# Patient Record
Sex: FEMALE | Race: WHITE
[De-identification: names, ages, dates, MRNs, and addresses within clinical notes are randomized per-mention and may not be internally consistent; named-entity substitution may affect disease eponyms.]

---

## 2010-06-09 PROCEDURE — 5A1935Z RESPIRATORY VENTILATION, LESS THAN 24 CONSECUTIVE HOURS: ICD-10-PCS | Performed by: SURGERY

## 2017-02-10 ENCOUNTER — HOSPITAL ENCOUNTER (INPATIENT)
Dept: HOSPITAL 80 - FED | Age: 47
LOS: 3 days | Discharge: HOME | DRG: 193 | End: 2017-02-13
Attending: INTERNAL MEDICINE | Admitting: INTERNAL MEDICINE
Payer: MEDICAID

## 2017-02-10 DIAGNOSIS — Z59.0: ICD-10-CM

## 2017-02-10 DIAGNOSIS — J44.1: ICD-10-CM

## 2017-02-10 DIAGNOSIS — R29.818: ICD-10-CM

## 2017-02-10 DIAGNOSIS — F17.200: ICD-10-CM

## 2017-02-10 DIAGNOSIS — J96.01: ICD-10-CM

## 2017-02-10 DIAGNOSIS — K21.9: ICD-10-CM

## 2017-02-10 DIAGNOSIS — E87.6: ICD-10-CM

## 2017-02-10 DIAGNOSIS — J18.8: Primary | ICD-10-CM

## 2017-02-10 LAB
% IMMATURE GRANULYOCYTES: 0.2 % (ref 0–1.1)
ABSOLUTE IMMATURE GRANULOCYTES: 0.03 10^3/UL (ref 0–0.1)
ABSOLUTE NRBC COUNT: 0 10^3/UL (ref 0–0.01)
ADD DIFF?: NO
ADD MORPH?: NO
ADD SCAN?: NO
ANION GAP SERPL CALC-SCNC: 14 MEQ/L (ref 8–16)
ATYPICAL LYMPHOCYTE FLAG: 10 (ref 0–99)
BILIRUB SERPL-MCNC: 0.4 MG/DL (ref 0.1–1.4)
CALCIUM SERPL-MCNC: 9.2 MG/DL (ref 8.5–10.4)
CHLORIDE SERPL-SCNC: 105 MEQ/L (ref 97–110)
CO2 SERPL-SCNC: 21 MEQ/L (ref 22–31)
CREAT SERPL-MCNC: 0.5 MG/DL (ref 0.6–1)
ERYTHROCYTE [DISTWIDTH] IN BLOOD BY AUTOMATED COUNT: 13.7 % (ref 11.5–15.2)
FRAGMENT RBC FLAG: 0 (ref 0–99)
GFR SERPL CREATININE-BSD FRML MDRD: > 60 ML/MIN/{1.73_M2}
GLUCOSE SERPL-MCNC: 92 MG/DL (ref 70–100)
HCT VFR BLD CALC: 36.6 % (ref 38–47)
HGB BLD-MCNC: 12.2 G/DL (ref 12.6–16.3)
LEFT SHIFT FLG: 0 (ref 0–99)
LIPEMIA HEMOLYSIS FLAG: 80 (ref 0–99)
MCH RBC BLDCO QN: 30.4 PG (ref 27.9–34.1)
MCHC RBC AUTO-ENTMCNC: 33.3 G/DL (ref 32.4–36.7)
MCV RBC AUTO: 91.3 FL (ref 81.5–99.8)
NRBC-AUTO%: 0 % (ref 0–0.2)
PLATELET # BLD: 307 10^3/UL (ref 150–400)
PLATELET CLUMPS FLAG: 0 (ref 0–99)
PMV BLD AUTO: 11.1 FL (ref 8.7–11.7)
POTASSIUM SERPL-SCNC: 3.4 MEQ/L (ref 3.5–5.2)
RBC # BLD AUTO: 4.01 10^6/UL (ref 4.18–5.33)
SODIUM SERPL-SCNC: 140 MEQ/L (ref 134–144)

## 2017-02-10 RX ADMIN — IPRATROPIUM BROMIDE AND ALBUTEROL SULFATE SCH ML: .5; 3 SOLUTION RESPIRATORY (INHALATION) at 22:25

## 2017-02-10 RX ADMIN — PROMETHAZINE HYDROCHLORIDE PRN MG: 25 INJECTION INTRAMUSCULAR; INTRAVENOUS at 22:21

## 2017-02-10 RX ADMIN — DEXTROSE MONOHYDRATE, SODIUM CHLORIDE, AND POTASSIUM CHLORIDE SCH MLS: 50; 4.5; 1.49 INJECTION, SOLUTION INTRAVENOUS at 21:20

## 2017-02-10 SDOH — ECONOMIC STABILITY - HOUSING INSECURITY: HOMELESSNESS: Z59.0

## 2017-02-10 NOTE — DX
Portable AP Upright Chest, at 6:24 PM



Clinical History: 46-year-old female in the ED with dyspnea and chest pain.



Comparison Study: None.



Findings: There are postcholecystectomy clips in the right upper quadrant of the abdomen. There are m
ild hypoventilatory features. There are bilateral perihilar interstitial/alveolar infiltrates, left g
reater than right. The cardiac silhouette is borderline-enlarged (although its size may be amplified 
by the poor inspiratory effort and the portable AP technique). There is no pleural effusion or pneumo
thorax. The trachea is midline. The osseous structures are age-appropriate.



Impression:



1. Perihilar interstitial/alveolar infiltrates, left greater than right.

2. Mild hypoventilatory changes.

3. Query borderline-cardiomegaly.



When clinically feasible, PA and lateral radiographs in the department would be helpful.
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
## 2017-02-11 LAB
% IMMATURE GRANULYOCYTES: 0.6 % (ref 0–1.1)
ABSOLUTE IMMATURE GRANULOCYTES: 0.05 10^3/UL (ref 0–0.1)
ABSOLUTE NRBC COUNT: 0 10^3/UL (ref 0–0.01)
ADD DIFF?: NO
ADD MORPH?: NO
ADD SCAN?: NO
ANION GAP SERPL CALC-SCNC: 10 MEQ/L (ref 8–16)
ATYPICAL LYMPHOCYTE FLAG: 0 (ref 0–99)
CALCIUM SERPL-MCNC: 9 MG/DL (ref 8.5–10.4)
CHLORIDE SERPL-SCNC: 110 MEQ/L (ref 97–110)
CO2 SERPL-SCNC: 22 MEQ/L (ref 22–31)
CREAT SERPL-MCNC: 0.4 MG/DL (ref 0.6–1)
ERYTHROCYTE [DISTWIDTH] IN BLOOD BY AUTOMATED COUNT: 13.7 % (ref 11.5–15.2)
FRAGMENT RBC FLAG: 0 (ref 0–99)
GFR SERPL CREATININE-BSD FRML MDRD: > 60 ML/MIN/{1.73_M2}
GLUCOSE SERPL-MCNC: 181 MG/DL (ref 70–100)
HCT VFR BLD CALC: 34.5 % (ref 38–47)
HGB BLD-MCNC: 11.5 G/DL (ref 12.6–16.3)
LEFT SHIFT FLG: 10 (ref 0–99)
LIPEMIA HEMOLYSIS FLAG: 80 (ref 0–99)
MCH RBC BLDCO QN: 30.3 PG (ref 27.9–34.1)
MCHC RBC AUTO-ENTMCNC: 33.3 G/DL (ref 32.4–36.7)
MCV RBC AUTO: 90.8 FL (ref 81.5–99.8)
NRBC-AUTO%: 0 % (ref 0–0.2)
PLATELET # BLD: 276 10^3/UL (ref 150–400)
PLATELET CLUMPS FLAG: 0 (ref 0–99)
PMV BLD AUTO: 10.4 FL (ref 8.7–11.7)
POTASSIUM SERPL-SCNC: 3.8 MEQ/L (ref 3.5–5.2)
RBC # BLD AUTO: 3.8 10^6/UL (ref 4.18–5.33)
SODIUM SERPL-SCNC: 142 MEQ/L (ref 134–144)

## 2017-02-11 RX ADMIN — ACETAMINOPHEN PRN MG: 325 TABLET ORAL at 06:26

## 2017-02-11 RX ADMIN — IPRATROPIUM BROMIDE AND ALBUTEROL SULFATE SCH ML: .5; 3 SOLUTION RESPIRATORY (INHALATION) at 17:02

## 2017-02-11 RX ADMIN — PROMETHAZINE HYDROCHLORIDE PRN MG: 25 INJECTION INTRAMUSCULAR; INTRAVENOUS at 08:15

## 2017-02-11 RX ADMIN — IBUPROFEN PRN MG: 200 TABLET, FILM COATED ORAL at 18:40

## 2017-02-11 RX ADMIN — IPRATROPIUM BROMIDE AND ALBUTEROL SULFATE SCH ML: .5; 3 SOLUTION RESPIRATORY (INHALATION) at 11:52

## 2017-02-11 RX ADMIN — IBUPROFEN PRN MG: 200 TABLET, FILM COATED ORAL at 12:38

## 2017-02-11 RX ADMIN — DEXTROSE MONOHYDRATE, SODIUM CHLORIDE, AND POTASSIUM CHLORIDE SCH MLS: 50; 4.5; 1.49 INJECTION, SOLUTION INTRAVENOUS at 08:18

## 2017-02-11 RX ADMIN — METHOCARBAMOL PRN MG: 750 TABLET ORAL at 16:58

## 2017-02-11 RX ADMIN — PROMETHAZINE HYDROCHLORIDE PRN MG: 25 INJECTION INTRAMUSCULAR; INTRAVENOUS at 19:49

## 2017-02-11 RX ADMIN — IPRATROPIUM BROMIDE AND ALBUTEROL SULFATE SCH ML: .5; 3 SOLUTION RESPIRATORY (INHALATION) at 21:35

## 2017-02-11 RX ADMIN — IPRATROPIUM BROMIDE AND ALBUTEROL SULFATE SCH ML: .5; 3 SOLUTION RESPIRATORY (INHALATION) at 05:10

## 2017-02-11 RX ADMIN — ACETAMINOPHEN PRN MG: 325 TABLET ORAL at 21:38
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
## 2017-02-12 RX ADMIN — IBUPROFEN PRN MG: 200 TABLET, FILM COATED ORAL at 11:05

## 2017-02-12 RX ADMIN — IPRATROPIUM BROMIDE AND ALBUTEROL SULFATE SCH ML: .5; 3 SOLUTION RESPIRATORY (INHALATION) at 17:46

## 2017-02-12 RX ADMIN — IBUPROFEN PRN MG: 200 TABLET, FILM COATED ORAL at 22:07

## 2017-02-12 RX ADMIN — ACETAMINOPHEN PRN MG: 325 TABLET ORAL at 13:37

## 2017-02-12 RX ADMIN — IPRATROPIUM BROMIDE AND ALBUTEROL SULFATE SCH ML: .5; 3 SOLUTION RESPIRATORY (INHALATION) at 21:52

## 2017-02-12 RX ADMIN — IPRATROPIUM BROMIDE AND ALBUTEROL SULFATE SCH ML: .5; 3 SOLUTION RESPIRATORY (INHALATION) at 05:37

## 2017-02-12 RX ADMIN — METHOCARBAMOL PRN MG: 750 TABLET ORAL at 13:38

## 2017-02-12 RX ADMIN — IPRATROPIUM BROMIDE AND ALBUTEROL SULFATE SCH ML: .5; 3 SOLUTION RESPIRATORY (INHALATION) at 11:27

## 2017-02-12 RX ADMIN — METHOCARBAMOL PRN MG: 750 TABLET ORAL at 22:07

## 2017-02-12 RX ADMIN — PANTOPRAZOLE SODIUM SCH MG: 40 TABLET, DELAYED RELEASE ORAL at 11:12

## 2017-02-12 RX ADMIN — METHOCARBAMOL PRN MG: 750 TABLET ORAL at 08:50

## 2017-02-12 RX ADMIN — METHOCARBAMOL PRN MG: 750 TABLET ORAL at 01:28
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
## 2017-02-13 VITALS — RESPIRATION RATE: 18 BRPM

## 2017-02-13 VITALS — DIASTOLIC BLOOD PRESSURE: 61 MMHG | SYSTOLIC BLOOD PRESSURE: 107 MMHG | HEART RATE: 84 BPM | TEMPERATURE: 98.4 F

## 2017-02-13 VITALS — OXYGEN SATURATION: 92 %

## 2017-02-13 RX ADMIN — PANTOPRAZOLE SODIUM SCH MG: 40 TABLET, DELAYED RELEASE ORAL at 08:29

## 2017-02-13 RX ADMIN — IBUPROFEN PRN MG: 200 TABLET, FILM COATED ORAL at 13:11

## 2017-02-13 RX ADMIN — ACETAMINOPHEN PRN MG: 325 TABLET ORAL at 08:37

## 2017-02-13 RX ADMIN — IPRATROPIUM BROMIDE AND ALBUTEROL SULFATE SCH ML: .5; 3 SOLUTION RESPIRATORY (INHALATION) at 11:05

## 2017-02-13 RX ADMIN — IPRATROPIUM BROMIDE AND ALBUTEROL SULFATE SCH ML: .5; 3 SOLUTION RESPIRATORY (INHALATION) at 05:26

## 2017-02-13 RX ADMIN — METHOCARBAMOL PRN MG: 750 TABLET ORAL at 13:11

## 2017-02-13 RX ADMIN — METHOCARBAMOL PRN MG: 750 TABLET ORAL at 08:30
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
## 2017-02-15 ENCOUNTER — HOSPITAL ENCOUNTER (EMERGENCY)
Dept: HOSPITAL 80 - FED | Age: 47
Discharge: HOME | End: 2017-02-15
Payer: MEDICAID

## 2017-02-15 VITALS
TEMPERATURE: 98.4 F | DIASTOLIC BLOOD PRESSURE: 84 MMHG | HEART RATE: 82 BPM | RESPIRATION RATE: 18 BRPM | OXYGEN SATURATION: 94 % | SYSTOLIC BLOOD PRESSURE: 160 MMHG

## 2017-02-15 DIAGNOSIS — Z87.891: ICD-10-CM

## 2017-02-15 DIAGNOSIS — Z79.82: ICD-10-CM

## 2017-02-15 DIAGNOSIS — J20.9: Primary | ICD-10-CM

## 2017-02-15 DIAGNOSIS — J18.9: ICD-10-CM
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
## 2017-02-16 ENCOUNTER — HOSPITAL ENCOUNTER (OUTPATIENT)
Dept: HOSPITAL 80 - FED | Age: 47
Setting detail: OBSERVATION
LOS: 1 days | Discharge: HOME | End: 2017-02-17
Attending: INTERNAL MEDICINE | Admitting: INTERNAL MEDICINE
Payer: MEDICAID

## 2017-02-16 DIAGNOSIS — J44.9: ICD-10-CM

## 2017-02-16 DIAGNOSIS — Z87.891: ICD-10-CM

## 2017-02-16 DIAGNOSIS — J96.01: ICD-10-CM

## 2017-02-16 DIAGNOSIS — R07.89: ICD-10-CM

## 2017-02-16 DIAGNOSIS — G62.9: ICD-10-CM

## 2017-02-16 DIAGNOSIS — K21.9: ICD-10-CM

## 2017-02-16 DIAGNOSIS — D72.829: ICD-10-CM

## 2017-02-16 DIAGNOSIS — J18.9: Primary | ICD-10-CM

## 2017-02-16 DIAGNOSIS — F41.9: ICD-10-CM

## 2017-02-16 DIAGNOSIS — Z59.0: ICD-10-CM

## 2017-02-16 DIAGNOSIS — Z87.820: ICD-10-CM

## 2017-02-16 LAB
% IMMATURE GRANULYOCYTES: 0.6 % (ref 0–1.1)
ABSOLUTE IMMATURE GRANULOCYTES: 0.09 10^3/UL (ref 0–0.1)
ABSOLUTE NRBC COUNT: 0 10^3/UL (ref 0–0.01)
ADD DIFF?: NO
ADD MORPH?: NO
ADD SCAN?: NO
ANION GAP SERPL CALC-SCNC: 14 MEQ/L (ref 8–16)
ATYPICAL LYMPHOCYTE FLAG: 10 (ref 0–99)
CALCIUM SERPL-MCNC: 9.7 MG/DL (ref 8.5–10.4)
CHLORIDE SERPL-SCNC: 104 MEQ/L (ref 97–110)
CO2 SERPL-SCNC: 24 MEQ/L (ref 22–31)
CREAT SERPL-MCNC: 0.6 MG/DL (ref 0.6–1)
ERYTHROCYTE [DISTWIDTH] IN BLOOD BY AUTOMATED COUNT: 13.4 % (ref 11.5–15.2)
FRAGMENT RBC FLAG: 0 (ref 0–99)
GFR SERPL CREATININE-BSD FRML MDRD: > 60 ML/MIN/{1.73_M2}
GLUCOSE SERPL-MCNC: 101 MG/DL (ref 70–100)
HCT VFR BLD CALC: 40.8 % (ref 38–47)
HGB BLD-MCNC: 13.3 G/DL (ref 12.6–16.3)
LEFT SHIFT FLG: 0 (ref 0–99)
LIPEMIA HEMOLYSIS FLAG: 80 (ref 0–99)
MCH RBC BLDCO QN: 31.2 PG (ref 27.9–34.1)
MCHC RBC AUTO-ENTMCNC: 32.6 G/DL (ref 32.4–36.7)
MCV RBC AUTO: 95.8 FL (ref 81.5–99.8)
NRBC-AUTO%: 0 % (ref 0–0.2)
PLATELET # BLD: 477 10^3/UL (ref 150–400)
PLATELET CLUMPS FLAG: 0 (ref 0–99)
PMV BLD AUTO: 10.8 FL (ref 8.7–11.7)
POTASSIUM SERPL-SCNC: 4.3 MEQ/L (ref 3.5–5.2)
RBC # BLD AUTO: 4.26 10^6/UL (ref 4.18–5.33)
SODIUM SERPL-SCNC: 142 MEQ/L (ref 134–144)
TROPONIN I SERPL-MCNC: < 0.012 NG/ML (ref 0–0.03)

## 2017-02-16 PROCEDURE — G0378 HOSPITAL OBSERVATION PER HR: HCPCS

## 2017-02-16 PROCEDURE — 93306 TTE W/DOPPLER COMPLETE: CPT

## 2017-02-16 PROCEDURE — 93005 ELECTROCARDIOGRAM TRACING: CPT

## 2017-02-16 PROCEDURE — 71020: CPT

## 2017-02-16 RX ADMIN — AZITHROMYCIN SCH MG: 250 TABLET, FILM COATED ORAL at 23:44

## 2017-02-16 RX ADMIN — HYDROCODONE BITARTRATE AND ACETAMINOPHEN PRN TAB: 5; 325 TABLET ORAL at 23:08

## 2017-02-16 RX ADMIN — KETOROLAC TROMETHAMINE PRN MG: 30 INJECTION, SOLUTION INTRAMUSCULAR at 23:44

## 2017-02-16 SDOH — ECONOMIC STABILITY - HOUSING INSECURITY: HOMELESSNESS: Z59.0

## 2017-02-16 NOTE — CPEKG
Heart Rate: 84

RR Interval: 714

P-R Interval: 172

QRSD Interval: 92

QT Interval: 400

QTC Interval: 473

P Axis: -14

QRS Axis: -46

T Wave Axis: 4

EKG Severity - ABNORMAL ECG -

EKG Impression: SINUS RHYTHM

EKG Impression: LEFT ANTERIOR FASCICULAR BLOCK

EKG Impression: BORDERLINE R WAVE PROGRESSION, ANTERIOR LEADS

Electronically Signed By: Manuel Cline 16-Feb-2017 22:25:41

## 2017-02-16 NOTE — PDGENHP
History and Physical





- Chief Complaint


cough





- History of Present Illness


Patient is a 46-year old female with COPD/chronic bronchitis, h/o TBI, anxiety 

disorder and GERD who presents to the ED with complaint of cough, shortness of 

breath and pleuritic chest pain. Patient was recently admitted (2/10-2/14) for 

COPD exacerbation and community acquired pneumonia, reports compliance with her 

discharge medications which included levofloxacin and prednisone. Despite this 

compliance, patient reports continued cough and dyspnea and now also describes 

a left-sided chest pain, pleuritic in nature. She states she can only walk 

steps before having to stop due to shortness of breath. Denies any recurrent 

fevers/chills. She came to the ED for her continued symptoms on 2/15, was given 

Percocet for her pleuritic pain and discharged back to her shelter. She 

presents again today with persistent symptoms. Denies any palpitations, headache

, dizziness, nausea,vomiting, diarrhea or dysuria. 





On arrival to the ED, patient was afebrile and hemodynamcally stable, although 

slightly hypoxic on room air. CXR revealed persistent bilateral interstitial 

infiltrates. Labs also revealed new leukocytosis, negative troponin. EKG was 

unremarkable. She was given nebs and admitted to the hospitalist service for 

further management. 





History Information





- Allergies/Home Medication List


Allergies/Adverse Reactions: 








droperidol [From Inapsine] Allergy (Severe, Verified 02/10/17 20:04)


 SEIZURES


haloperidol [From Haldol] Allergy (Severe, Verified 02/10/17 20:04)


 SEIZURES


haloperidol lactate [From Haldol] Allergy (Severe, Verified 02/10/17 20:04)


 SEIZURES





Home Medications: 








Acetamn/Diphenhydramine 500/25 [Tylenol PM (*)] 1 each PO HS PRN 02/10/17 [Last 

Taken Unknown]


Albuterol [Proventil Neb] 3 ml IH QID PRN 02/10/17 [Last Taken 02/16/17]


Aspirin/Caffeine [Bc Powder Packet] 1 each PO DAILY PRN 02/10/17 [Last Taken 02/ 16/17]


Albuterol [Proventil Inhaler HFA (*)] 1 - 2 puffs IH Q4H PRN 02/16/17 [Last 

Taken 02/16/17]





I have personally reviewed and updated: family history, medical history, social 

history, surgical history





- Past Medical History


Additional medical history: COPD.  GERD/hiatal hernia.  h/o closed traumatic 

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 

headaches.  anxiety disorder





- Surgical History


Additional surgical history: partial hysterectomy.  cholecystectomy.  lipoma 

resection





- Family History


Positive for: non-pertinent





- Social History


Smoking Status: Former smoker (reports she quit after recent hospitalization; 

formerly 1/2-1PPD x 15 years)


Alcohol Use: None


Drug Use: None


Additional social history: Patient is originally from [place], left there in 

January and has been homeless/travelling since that time. She has remained in 

the [place] area since end of Jan/early February, now resides in [facility] 

shelter. She reports having siblings and children in the [place] area.





Review of Systems


ROS: 10pt was reviewed & negative except for what was stated in HPI & below





Physical Exam














Temp Pulse Resp BP Pulse Ox


 


 36.6 C   75   20   123/78 H  98 


 


 02/16/17 21:47  02/16/17 21:56  02/16/17 21:56  02/16/17 21:56  02/16/17 21:56




















O2 (L/minute)                  2














Constitutional: no apparent distress, appears nourished, not in pain, obese


Eyes: PERRL, anicteric sclera, EOMI


Ears, Nose, Mouth, Throat: moist mucous membranes, hearing normal, ears appear 

normal, no oral mucosal ulcers


Cardiovascular: regular rate and rhythym, no murmur, rub, or gallop, pulses 

symmetric bilaterally, No JVD, No edema


Peripheral Pulses: 2+: dorsalis-pedis (R), dorsalis-pedis (L)


Respiratory: no respiratory distress, no rales or rhonchi, expiratory wheeze (

faint diffuse expiratory wheezing)


Gastrointestinal: normoactive bowel sounds, soft, non-tender abdomen, no 

palpable masses, No guarding, No rebound


Genitourinary: no bladder fullness, no bladder tenderness


Skin: warm, normal color, no rashes or abrasions, no fluctuance, no induration, 

No mottled


Musculoskeletal: full muscle strength, no muscle tenderness, normal joint ROM, 

no joint effusions


Neurologic: AAOx3, sensation intact bilaterally, CN II-XII Intact, No weakness, 

No numbness


Psychiatric: not encephalopathic, thought process linear, anxious





Lab Data & Imaging Review





 02/16/17 19:00





 02/16/17 19:00














WBC  15.89 10^3/uL (3.80-9.50)  H  02/16/17  19:00    


 


RBC  4.26 10^6/uL (4.18-5.33)   02/16/17  19:00    


 


Hgb  13.3 g/dL (12.6-16.3)   02/16/17  19:00    


 


Hct  40.8 % (38.0-47.0)   02/16/17  19:00    


 


MCV  95.8 fL (81.5-99.8)   02/16/17  19:00    


 


MCH  31.2 pg (27.9-34.1)   02/16/17  19:00    


 


MCHC  32.6 g/dL (32.4-36.7)   02/16/17  19:00    


 


RDW  13.4 % (11.5-15.2)   02/16/17  19:00    


 


Plt Count  477 10^3/uL (150-400)  H  02/16/17  19:00    


 


MPV  10.8 fL (8.7-11.7)   02/16/17  19:00    


 


Neut % (Auto)  77.4 % (39.3-74.2)  H  02/16/17  19:00    


 


Lymph % (Auto)  18.9 % (15.0-45.0)   02/16/17  19:00    


 


Mono % (Auto)  2.8 % (4.5-13.0)  L  02/16/17  19:00    


 


Eos % (Auto)  0.1 % (0.6-7.6)  L  02/16/17  19:00    


 


Baso % (Auto)  0.2 % (0.3-1.7)  L  02/16/17  19:00    


 


Nucleat RBC Rel Count  0.0 % (0.0-0.2)   02/16/17  19:00    


 


Absolute Neuts (auto)  12.31 10^3/uL (1.70-6.50)  H  02/16/17  19:00    


 


Absolute Lymphs (auto)  3.00 10^3/uL (1.00-3.00)   02/16/17  19:00    


 


Absolute Monos (auto)  0.44 10^3/uL (0.30-0.80)   02/16/17  19:00    


 


Absolute Eos (auto)  0.02 10^3/uL (0.03-0.40)  L  02/16/17  19:00    


 


Absolute Basos (auto)  0.03 10^3/uL (0.02-0.10)   02/16/17  19:00    


 


Absolute Nucleated RBC  0.00 10^3/uL (0-0.01)   02/16/17  19:00    


 


Immature Gran %  0.6 % (0.0-1.1)   02/16/17  19:00    


 


Immature Gran #  0.09 10^3/uL (0.00-0.10)   02/16/17  19:00    


 


Sodium  142 mEq/L (134-144)   02/16/17  19:00    


 


Potassium  4.3 mEq/L (3.5-5.2)   02/16/17  19:00    


 


Chloride  104 mEq/L ()   02/16/17  19:00    


 


Carbon Dioxide  24 mEq/l (22-31)   02/16/17  19:00    


 


Anion Gap  14 mEq/L (8-16)   02/16/17  19:00    


 


BUN  22 mg/dL (7-23)   02/16/17  19:00    


 


Creatinine  0.6 mg/dL (0.6-1.0)   02/16/17  19:00    


 


Estimated GFR  > 60   02/16/17  19:00    


 


Glucose  101 mg/dL ()  H  02/16/17  19:00    


 


Calcium  9.7 mg/dL (8.5-10.4)   02/16/17  19:00    


 


Troponin I  < 0.012 ng/mL (0-0.034)   02/16/17  19:00    








Visualized and Interpreted Chest x-ray results: Yes


Chest X-Ray results: other (b/l interstitial infiltrates, unchanged from prior 

cxr)


Visualized and Interpreted EKG results: Yes


EKG Interpretation: Positive for: normal sinsus rhythm (poor R wave progression)





Assessment & Plan


Assessment: 


 Patient is a 46-year-old-female with chronic bronchitis, anxiety disorder, 

GERD and recent admission for community acquired pneumonia who presents to the 

ED with persistent cough and pleuritic chest pain despite compliance with 

outpatient antibiotic and steroids. ED evaluation reveals mild hypoxia and new 

leukocytosis, with persistent infiltrates on CXR. 





Plan: 





# acute hypoxic respiratory failure, COPD


Likely due to persistent community acquired pneumonia and exacerbation of her 

chronic bronchitis/COPD. She denies a history of chronic respiratory failure. 

Given recent hospitalization and tachycardia, tachypenia present on presentation

, must also consider pulmonary embolism, cardiomyopathy. Will check d-dimer and 

TTE. Will also switch antibiotics, continue nebs and prednisone. 





# leukocytosis, thrombocytosis


Could be reactive in setting of continued outpatient prednisone, however, could 

also be failure of levaquin treatment. Patient continues to report cough, 

shortness of breath, and CXR shows persistent bilateral infiltrates, although 

not significantly worsened. Will switch abx to ceftriaxone and azithromycin. 

And will also check UA.





# chest pain


Patient describes left-sided chest pain, appears to be sharp and pleuritic in 

nature. Initial troponin and EKG are unremarkable, low suspicion for cardiac 

etiology. PE is possibility, will check d-dimer and also r/o ACS with serial 

cardiac enzymes, EKG. 





# anxiety disorder


Stable. Patient reports being prescribed clonazepam in the past occasionally 

for sleep, will cont this prn. 





# h/o traumatic brain injury with peripheral neuropathy


Stable. Can add Robaxin if needed.





# GERD


Stable.





# dispo: admit to [facility] for acute hypoxic respiratory failure management





# gen:


Regular diet


Full code

## 2017-02-16 NOTE — EDPHY
H & P


Time Seen by Provider: 02/16/17 19:54


HPI/ROS: 





Chief complaint.  Shortness of breath





HPI.  38-year-old female recently admitted for bilateral pneumonia on February 10th.  Negative flu.  Start on epic by accepts and because of COPD given IV Solu

-Medrol and started on prednisone.  She was discharged on February 14th in 

improved condition. She was seen in the emergency department yesterday because 

of coughing and pain from her umbilical hernia which was easily reduced.  She 

continues to cough.  She has sense of shortness of breath. No fever.  Some 

chest pain with cough.





ROS


Constitutional.  no fever/chills, no weakness


Eyes.  no problems with vision


ENT.  no sore throat, no nasal drainage


Cardiovascular.  Chest pain with cough


Respiratory.  Shortness of breath and cough


Abdominal.  Pain from periumbilical hernia


.  no problems urinating


MS.  no calf pain/swelling, no neck/back pain, no joint pain


Skin.  no rash


Lymph.  no swollen glands


Neuro.  no headache, no dizziness, no difficulty walking or with speech


Past Medical/Surgical History: 





Past medical history seen for GERD, memory loss, traumatic brain injury, 

chronic bronchitis, abdominal hernia, COPD, anxiety


Social History: 





Single, no alcohol, former smoker


Smoking Status: Former smoker


Physical Exam: 





General Appearance:  Alert well-developed female mild distress vital signs 

stable but significant for initial O2 saturation 88% on room air


Eyes: Pupils equal and round no pallor or injection.


ENT, Mouth:  Mucous membranes are moist.


Respiratory:  No retractions.  Mild bilateral rhonchi.


Cardiovascular: Regular rate and rhythm.


Gastrointestinal:  Abdomen is soft with easily reducible periumbilical hernia.  

No evidence for incarceration


Neurological:  Awake and alert, sensory and motor exams grossly normal.


Skin: Warm and dry, no rashes.


Musculoskeletal:  Neck is supple nontender.


Extremities  symmetrical, full range of motion.


Psychiatric: Patient is oriented X 3, there is no agitation.


Constitutional: 


 Initial Vital Signs











Temperature (C)  36.7 C   02/16/17 18:43


 


Heart Rate  87   02/16/17 18:43


 


Respiratory Rate  24 H  02/16/17 18:43


 


Blood Pressure  137/99 H  02/16/17 18:43


 


O2 Sat (%)  88 L  02/16/17 18:43








 











O2 Delivery Mode               Room Air


 


O2 (L/minute)                  2














Allergies/Adverse Reactions: 


 





droperidol [From Inapsine] Allergy (Severe, Verified 02/10/17 20:04)


 SEIZURES


haloperidol [From Haldol] Allergy (Severe, Verified 02/10/17 20:04)


 SEIZURES


haloperidol lactate [From Haldol] Allergy (Severe, Verified 02/10/17 20:04)


 SEIZURES








Home Medications: 














 Medication  Instructions  Recorded


 


Acetamn/Diphenhydramine 500/25 1 each PO HS PRN 02/10/17





[Tylenol PM (*)]  


 


Albuterol [Proventil Neb] 3 ml IH QID PRN 02/10/17


 


Aspirin/Caffeine [Bc Powder Packet] 1 each PO DAILY PRN 02/10/17


 


clonazePAM [Klonopin (*)] 0.5 mg PO BID PRN 02/10/17


 


Acetaminophen [Tylenol 325mg (*)] 650 mg PO Q4HRS PRN #30 tab 02/13/17


 


Ibuprofen [Motrin (*)] 400 mg PO Q6HRS PRN #30 tab 02/13/17


 


Methocarbamol [Robaxin 750 mg (*)] 750 mg PO TID PRN #20 tab 02/13/17


 


levOFLOXACIN [levAQUIN (*)] 750 mg PO DAILY AT 10AM #4 tab 02/13/17


 


predniSONE 20 mg PO DAILY #6 tablet 02/13/17














Medical Decision Making





- Diagnostics


EKG Interpretation: 





EKG interpreted by me shows normal sinus rhythm with normal interval.  There is 

left anterior fascicular block and left axis deviation.  QRS is otherwise 

normal.  There is no significant ST elevation or depression.  The rate is 84


Imaging: 





Chest x-ray interpreted by me shows improved bilateral pneumonia


Procedures: 





DuoNeb updraft, oxygen, monitor


ED Course/Re-evaluation: 





On re-evaluation patient feels like she is breathing better however still 

coughing and somewhat jittery.





Patient and I discussed x-ray findings as well as lab findings.  We discussed 

treatment plan and recommendation for admission.  She expresses understanding 

and agreement.  The patient lives in a homeless shelter and has recently moved 

to [place] does not have a regular physician or good support system





I consulted and discussed patient's case with hospitalist who agrees to the 

admission


Differential Diagnosis: 





Continuing pneumonia.  I considered influenza that she has had negative 

influenza swab.  Patient is hypoxic with bilateral pneumonia.  Plan admission





- Data Points


Laboratory Results: 


 Laboratory Results





 02/16/17 19:00 





 02/16/17 19:00 





 











  02/16/17 02/16/17





  19:00 19:00


 


WBC    15.89 10^3/uL H 10^3/uL





    (3.80-9.50) 


 


RBC    4.26 10^6/uL 10^6/uL





    (4.18-5.33) 


 


Hgb    13.3 g/dL g/dL





    (12.6-16.3) 


 


Hct    40.8 % %





    (38.0-47.0) 


 


MCV    95.8 fL fL





    (81.5-99.8) 


 


MCH    31.2 pg pg





    (27.9-34.1) 


 


MCHC    32.6 g/dL g/dL





    (32.4-36.7) 


 


RDW    13.4 % %





    (11.5-15.2) 


 


Plt Count    477 10^3/uL H 10^3/uL





    (150-400) 


 


MPV    10.8 fL fL





    (8.7-11.7) 


 


Neut % (Auto)    77.4 % H %





    (39.3-74.2) 


 


Lymph % (Auto)    18.9 % %





    (15.0-45.0) 


 


Mono % (Auto)    2.8 % L %





    (4.5-13.0) 


 


Eos % (Auto)    0.1 % L %





    (0.6-7.6) 


 


Baso % (Auto)    0.2 % L %





    (0.3-1.7) 


 


Nucleat RBC Rel Count    0.0 % %





    (0.0-0.2) 


 


Absolute Neuts (auto)    12.31 10^3/uL H 10^3/uL





    (1.70-6.50) 


 


Absolute Lymphs (auto)    3.00 10^3/uL 10^3/uL





    (1.00-3.00) 


 


Absolute Monos (auto)    0.44 10^3/uL 10^3/uL





    (0.30-0.80) 


 


Absolute Eos (auto)    0.02 10^3/uL L 10^3/uL





    (0.03-0.40) 


 


Absolute Basos (auto)    0.03 10^3/uL 10^3/uL





    (0.02-0.10) 


 


Absolute Nucleated RBC    0.00 10^3/uL 10^3/uL





    (0-0.01) 


 


Immature Gran %    0.6 % %





    (0.0-1.1) 


 


Immature Gran #    0.09 10^3/uL 10^3/uL





    (0.00-0.10) 


 


Sodium  142 mEq/L mEq/L  





   (134-144)  


 


Potassium  4.3 mEq/L mEq/L  





   (3.5-5.2)  


 


Chloride  104 mEq/L mEq/L  





   ()  


 


Carbon Dioxide  24 mEq/l mEq/l  





   (22-31)  


 


Anion Gap  14 mEq/L mEq/L  





   (8-16)  


 


BUN  22 mg/dL mg/dL  





   (7-23)  


 


Creatinine  0.6 mg/dL mg/dL  





   (0.6-1.0)  


 


Estimated GFR  > 60   





   


 


Glucose  101 mg/dL H mg/dL  





   ()  


 


Calcium  9.7 mg/dL mg/dL  





   (8.5-10.4)  


 


Troponin I  < 0.012 ng/mL ng/mL  





   (0-0.034)  











Medications Given: 


 








Discontinued Medications





Albuterol/Ipratropium (Duoneb)  3 ml IH EDNOW ONE


   Stop: 02/16/17 20:11


   Last Admin: 02/16/17 20:33 Dose:  3 ml








Departure





- Departure


Disposition: [facility] Inpatient Acute


Clinical Impression: 


 Hypoxia





Pneumonia


Qualifiers:


 Pneumonia type: due to unspecified organism Laterality: bilateral Lung location

: lower lobe of lung Qualified Code(s): J18.9 - Pneumonia, unspecified organism





Condition: Fair


Referrals: 


[facility] [Other] - As per Instructions

## 2017-02-17 VITALS
OXYGEN SATURATION: 91 % | RESPIRATION RATE: 17 BRPM | HEART RATE: 70 BPM | SYSTOLIC BLOOD PRESSURE: 100 MMHG | TEMPERATURE: 97.4 F | DIASTOLIC BLOOD PRESSURE: 78 MMHG

## 2017-02-17 LAB
% IMMATURE GRANULYOCYTES: 0.5 % (ref 0–1.1)
ABSOLUTE IMMATURE GRANULOCYTES: 0.05 10^3/UL (ref 0–0.1)
ABSOLUTE NRBC COUNT: 0 10^3/UL (ref 0–0.01)
ADD DIFF?: NO
ADD MORPH?: NO
ADD SCAN?: NO
ANION GAP SERPL CALC-SCNC: 9 MEQ/L (ref 8–16)
ATYPICAL LYMPHOCYTE FLAG: 10 (ref 0–99)
CALCIUM SERPL-MCNC: 8.7 MG/DL (ref 8.5–10.4)
CHLORIDE SERPL-SCNC: 107 MEQ/L (ref 97–110)
CO2 SERPL-SCNC: 25 MEQ/L (ref 22–31)
CREAT SERPL-MCNC: 0.5 MG/DL (ref 0.6–1)
ERYTHROCYTE [DISTWIDTH] IN BLOOD BY AUTOMATED COUNT: 13.5 % (ref 11.5–15.2)
FRAGMENT RBC FLAG: 0 (ref 0–99)
GFR SERPL CREATININE-BSD FRML MDRD: > 60 ML/MIN/{1.73_M2}
GLUCOSE SERPL-MCNC: 89 MG/DL (ref 70–100)
HCT VFR BLD CALC: 32.7 % (ref 38–47)
HGB BLD-MCNC: 10.8 G/DL (ref 12.6–16.3)
LEFT SHIFT FLG: 0 (ref 0–99)
LIPEMIA HEMOLYSIS FLAG: 80 (ref 0–99)
MCH RBC BLDCO QN: 31.2 PG (ref 27.9–34.1)
MCHC RBC AUTO-ENTMCNC: 33 G/DL (ref 32.4–36.7)
MCV RBC AUTO: 94.5 FL (ref 81.5–99.8)
NRBC-AUTO%: 0 % (ref 0–0.2)
PLATELET # BLD: 323 10^3/UL (ref 150–400)
PLATELET CLUMPS FLAG: 0 (ref 0–99)
PMV BLD AUTO: 9.7 FL (ref 8.7–11.7)
POTASSIUM SERPL-SCNC: 3.8 MEQ/L (ref 3.5–5.2)
RBC # BLD AUTO: 3.46 10^6/UL (ref 4.18–5.33)
SODIUM SERPL-SCNC: 141 MEQ/L (ref 134–144)
TROPONIN I SERPL-MCNC: 0.01 NG/ML (ref 0–0.03)

## 2017-02-17 RX ADMIN — HYDROCODONE BITARTRATE AND ACETAMINOPHEN PRN TAB: 5; 325 TABLET ORAL at 06:51

## 2017-02-17 RX ADMIN — KETOROLAC TROMETHAMINE PRN MG: 30 INJECTION, SOLUTION INTRAMUSCULAR at 06:52

## 2017-02-17 RX ADMIN — AZITHROMYCIN SCH MG: 250 TABLET, FILM COATED ORAL at 08:01

## 2017-02-17 NOTE — GDS
[f rep st]



                                                             DISCHARGE SUMMARY





DISCHARGE DIAGNOSES:  

1.  Atypical chest pain.  

2.  Acute hypoxic respiratory failure.  

3.  Chronic obstructive pulmonary disease.  

4.  Leukocytosis.  

5.  Anxiety disorder.



HISTORY OF PRESENT ILLNESS:  Patient is a 46-year-old female with history of COPD/chronic bronchitis
, and history of TBI, who presented to the emergency room with complaints of cough, shortness of radha
ath, and pleuritic chest pain.  She was recently admitted to 02/10 through 02/14 for COPD exacerbati
on and community-acquired pneumonia.  She states compliance with her discharge medications that incl
uded levofloxacin and prednisone.  Despite compliance, she reports continued cough and dyspnea, and 
describes a left-sided chest pain.  This pain can be sharp with her cough.  She has no radiation, as
sociated diaphoresis, nausea, or shortness of breath.  She gets similar pain when she has anxiety at
tacks.  She denies any recurrent fevers or chills.  She denies any headache, dizziness, nausea, vomi
ting, or diarrhea.  



In the emergency room, she was afebrile and hemodynamically stable.  Slightly hypoxic on room air.  
Chest x-ray revealed persistent bilateral interstitial infiltrates.



HOSPITAL COURSE BY PROBLEM:  

1.  Acute hypoxic respiratory failure:  This is multifactorial given underlying COPD/chronic bronchi
tis and recent community-acquired pneumonia.  Patient has been stable here, 91% on room air.  She ha
s been afebrile.  She completed a total of 5 days of Levaquin and prednisone as of today, including 
her outpatient medications.  TTE was done, which was negative for wall motion abnormalities.  Low berger
spicion for pulmonary embolism.  Given the reproducibility of the pain, and that it is associated wi
th her cough, as of recent, I provided the patient with cough suppressants.  Complicating the issue 
is that the patient is homeless, and does not want to stay in a shelter, as she feels that she canno
t rest there.  I explained to her that she is hemodynamically stable, not requiring oxygen, and has 
completed her course of medications.

2.  Atypical chest pain:  Stated above, likely secondary to her cough.  EKG and troponin were unrema
rkable.  There was no evidence of wall motion abnormality on echocardiogram.  There was reproducible
 pain, and she also associates this with her anxiety attacks.

3.  Anxiety:  The patient was requesting Klonopin.  I explained that she needs to follow up with her
 regular doctor, as I was not going to provide her with a new script for this.

4.  Leukocytosis:  This is resolved, likely secondary to steroid.  Patient was afebrile.

5.  Homelessness:  The patient just moved here from [place].  She has seen case management at her 
last hospitalization and declined further assistance.  She has a list of shelters.



DISPOSITION:  Patient is stable for discharge.



MEDICATIONS:  New medications :  Tessalon Perles.



FOLLOWUP:  Patient was advised to follow up with [facility].





Job #:  [id number]

## 2017-02-17 NOTE — ECHO
[id number]

[id number]



+---------------------------------------------------+      [address]

:                                                   :       [place] [address]

:                                                   :           [phone]

+---------------------------------------------------+       Fax [phone]



                       Adult Echocardiographic Report



+----------------------------------------------------------------------------

-----+

:Name: ELOY GUYSarah Date: 2017 09:33 AM                        

     :

:MRN: [id number]      Hospital Admission Number: [id number] Location

: 142:

:: 1970      Gender: Female                         Height: 62 in   

     :

:Age: 46 yrs          Race: WH                               Weight: 164 lb  

     :

:Reason For Study: Hypoxia with b/l infiltrates, possibly                    

     :

:pulmonary edema, r/o cardiomyopathy                         BSA: 1.8 meters2

     :

:History: No previous                                                        

     :

+----------------------------------------------------------------------------

-----+

MMode/2D Measurements \T\ Calculations

IVSd: 1.3 cm    LVIDd: 3.7 cm  FS: 34.8 %             LVOT diam: 2.1 cm

LVPWd: 1.0 cm   LVIDs: 2.4 cm  EDV(Teich): 56.3 ml

                               ESV(Teich): 19.7 ml    LVOT area: 3.5 cm2

                               EF(Teich): 64.9 %



Normal Measurement Values:

+----------------------------------------------------------------------------

----------------------------------+

:LVIDd (3.5-5.7cm)    IVSd (0.6-1.1cm)     LVPWd (0.6-1.1cm)     Aortic Root 

(2.0-3.7cm)Left Atrium (1.5-4.0cm):

:LV Vol(d) (76-115ml) LV Vol(s) (29-48ml)  Ejec Fraction (50-65%)PV Roberto (0.6-

1.2m/s)    TV Roberto (0.4-1.0m/s)    :

:MV E Roberto (0.8-1.0m/s)MV A Roberto (0.3-1.0m/s)LVOT Roberto (0.7-1.2m/s) Asc Ao Roberto (

0.9-1.8m/s)                       :

+----------------------------------------------------------------------------

----------------------------------+



Doppler Measurements \T\ Calculations

MV E max roberto:       MV V2 max:        Ao V2 max:          LV V1 max:

66.1 cm/sec         90.4 cm/sec       152.0 cm/sec        97.0 cm/sec

MV A max roberto:       MV max PG:        Ao max P.2 mmHg LV V1 max P.5 cm/sec         3.3 mmHg          Ao mean PG:         3.8 mmHg

MV E/A: 0.89        MV V2 mean:       5.0 mmHg            LV V1 mean PG:

MV dec time:        64.5 cm/sec       Ao V2 mean:         2.0 mmHg

0.20 sec            MV mean P.0 cm/sec        LV V1 mean:

                    2.0 mmHg          Ao V2 VTI: 31.2 cm  67.8 cm/sec

                    MV V2 VTI: 23.8 cmAVA(I,D): 2.1 cm2   LV V1 VTI: 19.1 cm

                    MVA(VTI): 2.8 cm2 JULIENNE(V,D): 2.2 cm2



        _____________________________________________________________

SV(LVOT): 66.2 ml   PA V2 max:        TR max roberto:

                    130.5 cm/sec      222.0 cm/sec

                    PA max PG:        TR max P.8 mmHg          19.7 mmHg



Left Ventricle

The left ventricle is normal in size and function. There is mild concentric

left ventricular hypertrophy. Ejection Fraction = 65%.







Right Ventricle

The right ventricle is mildly dilated.



Atria

The left atrial size is normal. Right atrium not well visualized.



Mitral Valve

The mitral valve is normal in structure and function. There is no mitral

valve stenosis. There is trace mitral regurgitation.



Tricuspid Valve

The tricuspid valve is normal in structure and function. There is no

tricuspid stenosis. There is mild tricuspid regurgitation.



Aortic Valve

The aortic valve is normal in structure and function. There is no aortic

stenosis. There is no aortic insufficiency.





Pulmonic Valve

The pulmonic valve is normal in structure and function. There is no pulmonic

valvular stenosis. There is no pulmonic valvular regurgitation.



Great Vessels

The aortic root is normal size.



Pericardium/Pleural

There is a fat pad seen. There is no pericardial effusion.



Conclusion

A complete two-dimensional transthoracic echocardiogram was performed (2D,

M-mode, Doppler and color flow Doppler).

The study was technically difficult.

The study was technically limited.

Limited windows due to hernia.

The left ventricle is normal in size and function.

Mild concentric left ventricular hypertrophy.

Ejection Fraction = 65%.

Trace mitral regurgitation, mild tricuspid regurgitation.





_____________________________________________________________________________





Final Reading Physician:

                        Jonel Sabillon signed on 2017 12:25 PM

Ordering Physician: Eliud Rosas

Performed By: Denise Goodman

## 2017-02-17 NOTE — CPEKG
Heart Rate: 76

RR Interval: 789

P-R Interval: 176

QRSD Interval: 100

QT Interval: 388

QTC Interval: 437

P Axis: 19

QRS Axis: -59

T Wave Axis: 20

EKG Severity - ABNORMAL ECG -

EKG Impression: SINUS RHYTHM

EKG Impression: LAD, CONSIDER LEFT ANTERIOR FASCICULAR BLOCK

EKG Impression: LOW VOLTAGE THROUGHOUT

Electronically Signed By: Brennen Noble 17-Feb-2017 13:26:23

## 2017-02-19 ENCOUNTER — HOSPITAL ENCOUNTER (EMERGENCY)
Dept: HOSPITAL 80 - FED | Age: 47
Discharge: HOME | End: 2017-02-19
Payer: MEDICAID

## 2017-02-19 VITALS
HEART RATE: 78 BPM | OXYGEN SATURATION: 94 % | DIASTOLIC BLOOD PRESSURE: 88 MMHG | TEMPERATURE: 98.2 F | SYSTOLIC BLOOD PRESSURE: 133 MMHG

## 2017-02-19 VITALS — RESPIRATION RATE: 18 BRPM

## 2017-02-19 DIAGNOSIS — Z87.891: ICD-10-CM

## 2017-02-19 DIAGNOSIS — Z79.82: ICD-10-CM

## 2017-02-19 DIAGNOSIS — J44.1: Primary | ICD-10-CM

## 2017-02-19 LAB
% IMMATURE GRANULYOCYTES: 0.5 % (ref 0–1.1)
ABSOLUTE IMMATURE GRANULOCYTES: 0.04 10^3/UL (ref 0–0.1)
ABSOLUTE NRBC COUNT: 0 10^3/UL (ref 0–0.01)
ADD DIFF?: NO
ADD MORPH?: NO
ADD SCAN?: NO
ANION GAP SERPL CALC-SCNC: 8 MEQ/L (ref 8–16)
ATYPICAL LYMPHOCYTE FLAG: 10 (ref 0–99)
CALCIUM SERPL-MCNC: 9 MG/DL (ref 8.5–10.4)
CHLORIDE SERPL-SCNC: 105 MEQ/L (ref 97–110)
CO2 SERPL-SCNC: 28 MEQ/L (ref 22–31)
CREAT SERPL-MCNC: 0.5 MG/DL (ref 0.6–1)
ERYTHROCYTE [DISTWIDTH] IN BLOOD BY AUTOMATED COUNT: 13.5 % (ref 11.5–15.2)
FRAGMENT RBC FLAG: 0 (ref 0–99)
GFR SERPL CREATININE-BSD FRML MDRD: > 60 ML/MIN/{1.73_M2}
GLUCOSE SERPL-MCNC: 87 MG/DL (ref 70–100)
HCT VFR BLD CALC: 38.9 % (ref 38–47)
HGB BLD-MCNC: 12.4 G/DL (ref 12.6–16.3)
LEFT SHIFT FLG: 0 (ref 0–99)
LIPEMIA HEMOLYSIS FLAG: 80 (ref 0–99)
MCH RBC BLDCO QN: 30 PG (ref 27.9–34.1)
MCHC RBC AUTO-ENTMCNC: 31.9 G/DL (ref 32.4–36.7)
MCV RBC AUTO: 94.2 FL (ref 81.5–99.8)
NRBC-AUTO%: 0 % (ref 0–0.2)
PLATELET # BLD: 438 10^3/UL (ref 150–400)
PLATELET CLUMPS FLAG: 20 (ref 0–99)
PMV BLD AUTO: 9.6 FL (ref 8.7–11.7)
POTASSIUM SERPL-SCNC: 4.7 MEQ/L (ref 3.5–5.2)
RBC # BLD AUTO: 4.13 10^6/UL (ref 4.18–5.33)
SODIUM SERPL-SCNC: 141 MEQ/L (ref 134–144)
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
## 2017-03-18 ENCOUNTER — HOSPITAL ENCOUNTER (INPATIENT)
Dept: HOSPITAL 80 - FED | Age: 47
LOS: 6 days | Discharge: HOME | DRG: 193 | End: 2017-03-24
Attending: FAMILY MEDICINE | Admitting: INTERNAL MEDICINE
Payer: MEDICAID

## 2017-03-18 DIAGNOSIS — J10.1: Primary | ICD-10-CM

## 2017-03-18 DIAGNOSIS — Z79.51: ICD-10-CM

## 2017-03-18 DIAGNOSIS — K21.9: ICD-10-CM

## 2017-03-18 DIAGNOSIS — Z87.820: ICD-10-CM

## 2017-03-18 DIAGNOSIS — F41.9: ICD-10-CM

## 2017-03-18 DIAGNOSIS — J96.01: ICD-10-CM

## 2017-03-18 DIAGNOSIS — E04.1: ICD-10-CM

## 2017-03-18 DIAGNOSIS — Z59.0: ICD-10-CM

## 2017-03-18 DIAGNOSIS — R60.0: ICD-10-CM

## 2017-03-18 DIAGNOSIS — M25.562: ICD-10-CM

## 2017-03-18 DIAGNOSIS — R91.8: ICD-10-CM

## 2017-03-18 DIAGNOSIS — J44.9: ICD-10-CM

## 2017-03-18 DIAGNOSIS — F17.200: ICD-10-CM

## 2017-03-18 LAB
% IMMATURE GRANULYOCYTES: 0.2 % (ref 0–1.1)
ABSOLUTE IMMATURE GRANULOCYTES: 0.02 10^3/UL (ref 0–0.1)
ABSOLUTE NRBC COUNT: 0 10^3/UL (ref 0–0.01)
ADD DIFF?: NO
ADD MORPH?: NO
ADD SCAN?: NO
ANION GAP SERPL CALC-SCNC: 12 MEQ/L (ref 8–16)
APTT BLD: 34.8 SEC (ref 23–38)
ATYPICAL LYMPHOCYTE FLAG: 40 (ref 0–99)
BILIRUB SERPL-MCNC: 0.6 MG/DL (ref 0.1–1.4)
CALCIUM SERPL-MCNC: 9 MG/DL (ref 8.5–10.4)
CHLORIDE SERPL-SCNC: 101 MEQ/L (ref 97–110)
CO2 SERPL-SCNC: 25 MEQ/L (ref 22–31)
COLOR UR: YELLOW
CREAT SERPL-MCNC: 0.6 MG/DL (ref 0.6–1)
ERYTHROCYTE [DISTWIDTH] IN BLOOD BY AUTOMATED COUNT: 12.8 % (ref 11.5–15.2)
FRAGMENT RBC FLAG: 0 (ref 0–99)
GFR SERPL CREATININE-BSD FRML MDRD: > 60 ML/MIN/{1.73_M2}
GLUCOSE SERPL-MCNC: 93 MG/DL (ref 70–100)
HCT VFR BLD CALC: 39.2 % (ref 38–47)
HGB BLD-MCNC: 13.2 G/DL (ref 12.6–16.3)
INR PPP: 1.13 (ref 0.83–1.16)
LEFT SHIFT FLG: 0 (ref 0–99)
LIPEMIA HEMOLYSIS FLAG: 80 (ref 0–99)
MCH RBC BLDCO QN: 30.3 PG (ref 27.9–34.1)
MCHC RBC AUTO-ENTMCNC: 33.7 G/DL (ref 32.4–36.7)
MCV RBC AUTO: 90.1 FL (ref 81.5–99.8)
MUCOUS THREADS #/AREA URNS LPF: (no result) /LPF
NITRITE UR QL STRIP: NEGATIVE
NRBC-AUTO%: 0 % (ref 0–0.2)
PH UR STRIP: 5 [PH] (ref 5–7.5)
PLATELET # BLD: 346 10^3/UL (ref 150–400)
PLATELET CLUMPS FLAG: 20 (ref 0–99)
PMV BLD AUTO: 10.3 FL (ref 8.7–11.7)
POTASSIUM SERPL-SCNC: 3.6 MEQ/L (ref 3.5–5.2)
PROTHROMBIN TIME: 14.4 SEC (ref 12–15)
RBC # BLD AUTO: 4.35 10^6/UL (ref 4.18–5.33)
RBC #/AREA URNS HPF: (no result) /HPF (ref 0–3)
SODIUM SERPL-SCNC: 138 MEQ/L (ref 134–144)
SP GR UR STRIP: 1.02 (ref 1–1.03)
WBC #/AREA URNS HPF: (no result) /HPF (ref 0–3)

## 2017-03-18 PROCEDURE — G0378 HOSPITAL OBSERVATION PER HR: HCPCS

## 2017-03-18 RX ADMIN — IPRATROPIUM BROMIDE AND ALBUTEROL SULFATE SCH ML: .5; 3 SOLUTION RESPIRATORY (INHALATION) at 16:07

## 2017-03-18 RX ADMIN — GUAIFENESIN SCH MG: 600 TABLET, EXTENDED RELEASE ORAL at 19:46

## 2017-03-18 RX ADMIN — LIDOCAINE SCH EA: 50 PATCH TOPICAL at 16:47

## 2017-03-18 RX ADMIN — IPRATROPIUM BROMIDE AND ALBUTEROL SULFATE SCH ML: .5; 3 SOLUTION RESPIRATORY (INHALATION) at 21:02

## 2017-03-18 RX ADMIN — NICOTINE SCH MG: 14 PATCH TRANSDERMAL at 14:52

## 2017-03-18 RX ADMIN — METHOCARBAMOL SCH MG: 500 TABLET ORAL at 14:52

## 2017-03-18 RX ADMIN — METHOCARBAMOL SCH MG: 500 TABLET ORAL at 22:00

## 2017-03-18 RX ADMIN — DIAZEPAM PRN MG: 5 TABLET ORAL at 22:00

## 2017-03-18 RX ADMIN — IBUPROFEN PRN MG: 600 TABLET ORAL at 22:00

## 2017-03-18 RX ADMIN — BENZONATATE PRN MG: 100 CAPSULE, LIQUID FILLED ORAL at 22:00

## 2017-03-18 RX ADMIN — BENZONATATE PRN MG: 100 CAPSULE, LIQUID FILLED ORAL at 18:03

## 2017-03-18 RX ADMIN — OXYCODONE HYDROCHLORIDE AND ACETAMINOPHEN SCH MG: 500 TABLET ORAL at 19:47

## 2017-03-18 RX ADMIN — NICOTINE SCH: 14 PATCH TRANSDERMAL at 14:54

## 2017-03-18 SDOH — ECONOMIC STABILITY - HOUSING INSECURITY: HOMELESSNESS: Z59.0

## 2017-03-18 NOTE — GHP
[f 
rep st]



                                                            HISTORY AND PHYSICAL





DATE OF ADMISSION:  2017



CHIEF COMPLAINT:  Shortness of breath.



HISTORY OF PRESENT ILLNESS:  The patient is a 47-year-old female with a history 
of COPD, bronchitis, traumatic brain injury, and anxiety, who presented to the 
emergency room with shortness of breath.  She  was going to try and take a bus 
from the homeless shelter here but that she was feeling so poorly that she 
called the EMS to be transported to the hospital.  This is her 3rd visit since 
 for similar symptoms.  She had a temperature last night of 103.  
She has a sore throat with frequent coughing episodes.  She has a productive 
cough with yellow sputum.  She has never quite felt like she improved since her 
last discharge.  She also describes feeling extremely exhausted and has 
associated chills.  She denies any chest pain.  She denies any gastrointestinal 
symptoms. She denies any type of headache. Her appetite has been poor. She was 
checked for the influenza which was negative.  



PAST MEDICAL HISTORY:  

1.  COPD.

2.  Bronchitis.

3.  Total brain injury in .

4.  GERD.

5.  Anxiety disorder.

6.  Chronic lower extremity leg edema.



PAST SURGICAL HISTORY:  

1.  Cholecystectomy.

2.  Lipoma resection.

3.  Partial hysterectomy.



SOCIAL HISTORY:  She started smoking again.  She describes it as a little bit 
and does not state how much she is smoking.  She does not drink alcohol.  She 
is currently not in a relationship.  She is originally from [place].  Her 
siblings and children live in the [place] area.  When I asked her why she 
moved to [place], she said after her mom  she was having increased stress 
and anxiety and decided to get away from the issues of her family.



FAMILY HISTORY:  Her mom had dementia.  She is unclear what she  from but 
she  at age 72.  Her father  at age 52.  He had lung issues/emphysema.



ALLERGIES:  Droperidol, haloperidol, and haloperidol lactate.



MEDICATIONS:  Vitamin D 1000 units daily, Robaxin 500 mg p.o. t.i.d., Atrovent 
nebulizer 0.5 mg inhalation q.i.d., vitamin C 500 mg p.o. b.i.d., hydroxyzine 
25 mg p.o. q.8 hours, Lidoderm patches 0.5% topical daily, ibuprofen 600 mg q.8 
hours, Tessalon Perles 200 mg t.i.d. p.r.n., albuterol nebulizer 3 mL 
inhalation q.i.d., and albuterol inhaler 1-2 puffs inhalation q.4 hours as 
needed.



REVIEW OF SYSTEMS:  A 10-point review of systems was performed and was negative 
other than pertinent positives in the HPI and past medical history.



PHYSICAL EXAM:  GENERAL:  The patient is a 47-year-old female who does not 
appear to be in any acute distress.  VITAL SIGNS:  Blood pressure is 114/72.  
Heart rate is 82. , Respiratory rate is 20.  O2 sats on 0.5 L are 97%.  
Temperature is 37.1 Celsius.  HEENT:  Eyes:  Pupils are equal and reactive.  
EOMs are intact.  No conjunctival injection noted.  ENT:  Normal ears.  Hearing 
intact.  Her front teeth are missing.  Her oral airway is moist.  NECK:  
Trachea is midline.  CARDIOVASCULAR:  She is in a regular rate and rhythm.  No 
murmur, rub, or gallops noted.  No JVD.  No edema.  PERIPHERAL PULSES:  She has 
2+ DP and PT pulses.  RESPIRATORY:  She has normal respiratory effort.  She has 
a few scattered wheezes without rales or rhonchi.  Diminished bibasilar.  
ABDOMEN:  Soft and nontender.  SKIN:  No rashes or ulcer noted.  MUSCULOSKELETAL
:  She has equal upper and lower extremity strength.  PSYCHIATRIC:  She is 
alert and oriented.  She is not encephalopathic.  Thought process is linear.  
She is anxious.



LABORATORY FINDINGS:  Data reviewed.  A CBC was performed, which showed a white 
blood cell count of 12.01, hemoglobin 13.2, hematocrit of 39.2, platelet count 
of 346.  Venous lactic acid is 0.8.  Chemistry panel:  Sodium is 138, potassium 
3.6, chloride of 101, BUN of 8, creatinine 0.6, glucose of 93.  She is negative 
for the influenza. 



A chest x-ray was performed and showed interval increase in bilateral diffuse 
interstitial infiltrates. 



I reviewed her care with Dr. Jason Soto, emergency room physician.



ASSESSMENT/PLAN:  

1.  Sepsis, likely due to pneumonia.  She has a low-grade fever and elevated 
white blood cell count and was tachypneic while in the emergency room.  She has 
been well hydrated and has improved already. Lactate is stable. 

2.  Community-acquired pneumonia.  Will place her on Levaquin as started in the 
emergency room.  Will follow up with blood cultures.

3.  Chronic bronchitis.  This is likely secondary to chronic obstructive 
pulmonary disease.  It does not appear that she has an acute exacerbation.  
Will hold off on steroids at this time since we are treating her for pneumonia 
and see if she improves. Will order medications for her cough. Duonebs are 
ordered. 

4.  Left knee pain.  Will ask physical therapy and occupational therapy to work 
with her.

5.  Nicotine dependence.  Nicotine patch has been offered.

6.  Anxiety.  I suspect this is why she comes to the emergency room frequently.
  Will ask case management to get involved. 

7.  Homelessness, overall impacting her health. She spends her days at resource 
centers or the park. 

8.  DVT prophylaxis.  She is at moderate risk.  For now, will hold low-molecular
-weight heparin.  If she needs to stay another midnight stay, this can be 
initiated.  For now, we will have her ambulate and provide knee-high FATMATA hose.

9.  Code status:  Full.

10.  Length of stay:  I expect that she will require less than a 2-midnight stay
, which will make her observation status.  This can be reevaluated if needed.





Job #:  [id number]

MTDD
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
## 2017-03-19 LAB
% IMMATURE GRANULYOCYTES: 0.4 % (ref 0–1.1)
ABSOLUTE IMMATURE GRANULOCYTES: 0.05 10^3/UL (ref 0–0.1)
ABSOLUTE NRBC COUNT: 0 10^3/UL (ref 0–0.01)
ADD DIFF?: NO
ADD MORPH?: NO
ADD SCAN?: NO
ALBUMIN SERPL-MCNC: 3.5 G/DL (ref 3.5–5)
ALP SERPL-CCNC: 103 IU/L (ref 38–126)
ALT SERPL-CCNC: 43 IU/L (ref 9–52)
ANION GAP SERPL CALC-SCNC: 13 MEQ/L (ref 8–16)
AST SERPL-CCNC: 32 IU/L (ref 14–46)
ATYPICAL LYMPHOCYTE FLAG: 60 (ref 0–99)
BILIRUB SERPL-MCNC: 0.3 MG/DL (ref 0.1–1.4)
CALCIUM SERPL-MCNC: 9.1 MG/DL (ref 8.5–10.4)
CHLORIDE SERPL-SCNC: 103 MEQ/L (ref 97–110)
CO2 SERPL-SCNC: 21 MEQ/L (ref 22–31)
CREAT SERPL-MCNC: 0.6 MG/DL (ref 0.6–1)
ERYTHROCYTE [DISTWIDTH] IN BLOOD BY AUTOMATED COUNT: 12.8 % (ref 11.5–15.2)
FRAGMENT RBC FLAG: 0 (ref 0–99)
GFR SERPL CREATININE-BSD FRML MDRD: > 60 ML/MIN/{1.73_M2}
GLUCOSE SERPL-MCNC: 122 MG/DL (ref 70–100)
HCT VFR BLD CALC: 35.4 % (ref 38–47)
HGB BLD-MCNC: 11.7 G/DL (ref 12.6–16.3)
LEFT SHIFT FLG: 10 (ref 0–99)
LIPEMIA HEMOLYSIS FLAG: 80 (ref 0–99)
MCH RBC BLDCO QN: 29.6 PG (ref 27.9–34.1)
MCHC RBC AUTO-ENTMCNC: 33.1 G/DL (ref 32.4–36.7)
MCV RBC AUTO: 89.6 FL (ref 81.5–99.8)
NRBC-AUTO%: 0 % (ref 0–0.2)
PLATELET # BLD: 327 10^3/UL (ref 150–400)
PLATELET CLUMPS FLAG: 0 (ref 0–99)
PMV BLD AUTO: 10.4 FL (ref 8.7–11.7)
POTASSIUM SERPL-SCNC: 4.1 MEQ/L (ref 3.5–5.2)
PROT SERPL-MCNC: 7 G/DL (ref 6.3–8.2)
RBC # BLD AUTO: 3.95 10^6/UL (ref 4.18–5.33)
SODIUM SERPL-SCNC: 137 MEQ/L (ref 134–144)

## 2017-03-19 RX ADMIN — BENZONATATE PRN MG: 100 CAPSULE, LIQUID FILLED ORAL at 20:39

## 2017-03-19 RX ADMIN — METHOCARBAMOL SCH MG: 500 TABLET ORAL at 22:24

## 2017-03-19 RX ADMIN — IBUPROFEN PRN MG: 600 TABLET ORAL at 05:59

## 2017-03-19 RX ADMIN — GUAIFENESIN SCH MG: 600 TABLET, EXTENDED RELEASE ORAL at 10:00

## 2017-03-19 RX ADMIN — METHOCARBAMOL SCH MG: 500 TABLET ORAL at 09:52

## 2017-03-19 RX ADMIN — DIAZEPAM PRN MG: 5 TABLET ORAL at 20:40

## 2017-03-19 RX ADMIN — OXYCODONE HYDROCHLORIDE AND ACETAMINOPHEN SCH MG: 500 TABLET ORAL at 20:40

## 2017-03-19 RX ADMIN — IPRATROPIUM BROMIDE AND ALBUTEROL SULFATE SCH ML: .5; 3 SOLUTION RESPIRATORY (INHALATION) at 16:54

## 2017-03-19 RX ADMIN — LIDOCAINE SCH EA: 50 PATCH TOPICAL at 10:05

## 2017-03-19 RX ADMIN — IPRATROPIUM BROMIDE AND ALBUTEROL SULFATE SCH ML: .5; 3 SOLUTION RESPIRATORY (INHALATION) at 20:54

## 2017-03-19 RX ADMIN — IBUPROFEN PRN MG: 600 TABLET ORAL at 22:23

## 2017-03-19 RX ADMIN — GUAIFENESIN SCH MG: 600 TABLET, EXTENDED RELEASE ORAL at 20:39

## 2017-03-19 RX ADMIN — ACETAMINOPHEN PRN MG: 325 TABLET ORAL at 20:39

## 2017-03-19 RX ADMIN — ENOXAPARIN SODIUM SCH MG: 100 INJECTION SUBCUTANEOUS at 16:35

## 2017-03-19 RX ADMIN — IPRATROPIUM BROMIDE AND ALBUTEROL SULFATE SCH ML: .5; 3 SOLUTION RESPIRATORY (INHALATION) at 10:47

## 2017-03-19 RX ADMIN — METHOCARBAMOL SCH MG: 500 TABLET ORAL at 15:44

## 2017-03-19 RX ADMIN — IPRATROPIUM BROMIDE AND ALBUTEROL SULFATE SCH ML: .5; 3 SOLUTION RESPIRATORY (INHALATION) at 05:25

## 2017-03-19 RX ADMIN — VITAMIN D, TAB 1000IU (100/BT) SCH UNITS: 25 TAB at 10:00

## 2017-03-19 RX ADMIN — OXYCODONE HYDROCHLORIDE AND ACETAMINOPHEN SCH MG: 500 TABLET ORAL at 13:55

## 2017-03-19 RX ADMIN — NICOTINE SCH: 14 PATCH TRANSDERMAL at 10:23

## 2017-03-19 RX ADMIN — DIAZEPAM PRN MG: 5 TABLET ORAL at 09:51

## 2017-03-19 RX ADMIN — IBUPROFEN PRN MG: 600 TABLET ORAL at 13:54

## 2017-03-19 NOTE — HOSPPROG
Hospitalist Progress Note


Assessment/Plan: 





47-year-old female admitted with a complaint of shortness of breath cough 

sputum production.  Patient is new to me today.





-acute bronchitis with a productive cough and shortness of breath and mild 

hypoxemia.  Review of the chest x-ray which show that she has never really 

improved her chest x-ray over the past 4 weeks despite intermittent treatments.

  Though it is possible to have non clearing chest x-ray and resolution of 

symptoms this patient continues to have shortness of breath and cough.  We 

placed her back on bronchodilators antibiotics.  The question is come up assist 

regarding her PPD as she is staying in a shelter.  I have confirmed that her 

PPD on 2/ 2016 was negative through test at the [facility].





-COPD with possibly chronic bronchitis and active tobacco use.  She smokes very 

little and declines a nicotine patch at this time.





-GERD.  Will continue treatment with PPI medication.





-acute and chronic anxiety.  Will use medication as necessary.





-chronic diagnosis::  Traumatic brain injury in 2002. Known chronic edema.





-code status is full





-PPD prophylaxis:  Lovenox and ambulation.


Subjective: Reports she is feeling somewhat improved with the bronchodilators 

and antibiotics.  Denies chest pain nausea or vomiting.


Objective: 


 Vital Signs











Temp Pulse Resp BP Pulse Ox


 


 36.5 C   70   18   97/65 L  96 


 


 03/19/17 12:23  03/19/17 12:23  03/19/17 12:23  03/19/17 12:23  03/19/17 12:23








 Laboratory Results





 03/19/17 04:02 





 03/19/17 04:02 





 











 03/18/17 03/19/17 03/20/17





 05:59 05:59 05:59


 


Intake Total  600 


 


Output Total  350 


 


Balance  250 








 











PT  14.4 SEC (12.0-15.0)   03/18/17  09:04    


 


INR  1.13  (0.83-1.16)   03/18/17  09:04    














- Time Spent With Patient


Time Spent with Patient: greater than 35 minutes


Time Spent with Patient: Greater than 35 minutes spent on this patients care, 

greater than 50% of time spent counseling, educating, and coordinating care 

regarding the above mentioned plan.





- Pending Discharge


Pending Discharge Within 24 Hours: No


Pending Discharge Within 48 Hours: Yes


Pending Discharge Date: 03/21/17


Pending Discharge Time: 11:00





- Physical Exam


Constitutional: chronically ill appearing


Eyes: PERRL


Ears, Nose, Mouth, Throat: hearing normal


Cardiovascular: regular rate and rhythym, no murmur, rub, or gallop


Respiratory: no respiratory distress, reduced air movement, bronchial breath 

sounds, rhonchi


Gastrointestinal: normoactive bowel sounds, soft, non-tender abdomen, no 

palpable masses


Genitourinary: no bladder fullness


Skin: warm


Musculoskeletal: generalized weakness


Neurologic: AAOx3, CN II-XII Intact





ICD10 Worksheet


Patient Problems: 


 Problems











Problem Status Onset


 


Pneumonia Acute  


 


Hypoxia Acute  


 


Sepsis Acute

## 2017-03-20 LAB
% IMMATURE GRANULYOCYTES: 0.2 % (ref 0–1.1)
ABSOLUTE IMMATURE GRANULOCYTES: 0.02 10^3/UL (ref 0–0.1)
ABSOLUTE NRBC COUNT: 0 10^3/UL (ref 0–0.01)
ADD DIFF?: NO
ADD MORPH?: NO
ADD SCAN?: NO
ATYPICAL LYMPHOCYTE FLAG: 80 (ref 0–99)
ERYTHROCYTE [DISTWIDTH] IN BLOOD BY AUTOMATED COUNT: 13.2 % (ref 11.5–15.2)
FRAGMENT RBC FLAG: 0 (ref 0–99)
HCT VFR BLD CALC: 35.6 % (ref 38–47)
HGB BLD-MCNC: 11.6 G/DL (ref 12.6–16.3)
LEFT SHIFT FLG: 0 (ref 0–99)
LIPEMIA HEMOLYSIS FLAG: 80 (ref 0–99)
MCH RBC BLDCO QN: 29.7 PG (ref 27.9–34.1)
MCHC RBC AUTO-ENTMCNC: 32.6 G/DL (ref 32.4–36.7)
MCV RBC AUTO: 91 FL (ref 81.5–99.8)
NRBC-AUTO%: 0 % (ref 0–0.2)
PLATELET # BLD: 280 10^3/UL (ref 150–400)
PLATELET CLUMPS FLAG: 0 (ref 0–99)
PMV BLD AUTO: 10.5 FL (ref 8.7–11.7)
RBC # BLD AUTO: 3.91 10^6/UL (ref 4.18–5.33)

## 2017-03-20 RX ADMIN — GUAIFENESIN SCH MG: 600 TABLET, EXTENDED RELEASE ORAL at 08:52

## 2017-03-20 RX ADMIN — IPRATROPIUM BROMIDE AND ALBUTEROL SULFATE SCH ML: .5; 3 SOLUTION RESPIRATORY (INHALATION) at 21:51

## 2017-03-20 RX ADMIN — IPRATROPIUM BROMIDE AND ALBUTEROL SULFATE SCH ML: .5; 3 SOLUTION RESPIRATORY (INHALATION) at 05:39

## 2017-03-20 RX ADMIN — ENOXAPARIN SODIUM SCH MG: 100 INJECTION SUBCUTANEOUS at 08:52

## 2017-03-20 RX ADMIN — BENZONATATE PRN MG: 100 CAPSULE, LIQUID FILLED ORAL at 14:33

## 2017-03-20 RX ADMIN — DIAZEPAM PRN MG: 5 TABLET ORAL at 09:23

## 2017-03-20 RX ADMIN — DIAZEPAM PRN MG: 5 TABLET ORAL at 21:36

## 2017-03-20 RX ADMIN — GUAIFENESIN SCH MG: 600 TABLET, EXTENDED RELEASE ORAL at 21:36

## 2017-03-20 RX ADMIN — OXYCODONE HYDROCHLORIDE AND ACETAMINOPHEN SCH MG: 500 TABLET ORAL at 21:36

## 2017-03-20 RX ADMIN — IBUPROFEN PRN MG: 600 TABLET ORAL at 21:36

## 2017-03-20 RX ADMIN — OXYCODONE HYDROCHLORIDE AND ACETAMINOPHEN SCH MG: 500 TABLET ORAL at 08:52

## 2017-03-20 RX ADMIN — METHOCARBAMOL SCH MG: 500 TABLET ORAL at 16:33

## 2017-03-20 RX ADMIN — METHOCARBAMOL SCH MG: 500 TABLET ORAL at 08:52

## 2017-03-20 RX ADMIN — METHOCARBAMOL SCH MG: 500 TABLET ORAL at 21:37

## 2017-03-20 RX ADMIN — IPRATROPIUM BROMIDE AND ALBUTEROL SULFATE SCH: .5; 3 SOLUTION RESPIRATORY (INHALATION) at 13:38

## 2017-03-20 RX ADMIN — IBUPROFEN PRN MG: 600 TABLET ORAL at 06:15

## 2017-03-20 RX ADMIN — LIDOCAINE SCH EA: 50 PATCH TOPICAL at 09:08

## 2017-03-20 RX ADMIN — NICOTINE SCH: 14 PATCH TRANSDERMAL at 08:53

## 2017-03-20 RX ADMIN — IPRATROPIUM BROMIDE AND ALBUTEROL SULFATE SCH ML: .5; 3 SOLUTION RESPIRATORY (INHALATION) at 16:36

## 2017-03-20 RX ADMIN — VITAMIN D, TAB 1000IU (100/BT) SCH UNITS: 25 TAB at 08:53

## 2017-03-20 NOTE — PCMIDPN
Assessment/Plan: 





47-year-old female admitted with a complaint of shortness of breath cough 

sputum production.  





-acute bronchitis with a productive cough and shortness of breath and mild 

hypoxemia.  Review of the chest x-ray which show that she has never really 

improved her chest x-ray over the past 4 weeks despite intermittent treatments.

  Though it is possible to have non clearing chest x-ray and resolution of 

symptoms this patient continues to have shortness of breath and cough.  We 

placed her back on bronchodilators antibiotics.  The question is come up assist 

regarding her PPD as she is staying in a shelter.  I have confirmed that her 

PPD on 2/ 2016 was negative through test at the [facility].


   The influenza screen was negative and the Legionella antigen and the 

Legionella antigen and strep antigen are pending on the urine test.


  Plan:  Changed to p.o. Levaquin and continue bronchodilator therapy.  In 

light of her continued tobacco use and persistent symptoms of bronchitis I will 

add a steroid inhaler.





-COPD with possibly chronic bronchitis and active tobacco use.  She smokes very 

little and declines a nicotine patch at this time.





-GERD.  Will continue treatment with PPI medication.





-acute and chronic anxiety.  Will use medication as necessary.





-chronic diagnosis::  Traumatic brain injury in 2002. Known chronic edema.





-code status is full





-PPD prophylaxis:  Lovenox and ambulation.





-disposition:  Discharge tomorrow on p.  O. antibiotics and bronchodilators.  

Suggested follow-up would be at the [facility].  Patient is new to the 

area as she is from [place] and has been traveling for the last several 

months.


03/20/17 11:03





03/20/17 11:06





Subjective: 





Says she feels improved with less shortness of breath.  Her cough is less 

productive but she continues to produce gray white sputum.  No chest pain 

nausea vomiting she is eating well.


Objective: 


 Vital Signs











Temp Pulse Resp BP Pulse Ox


 


 37.1 C   73   18   97/64 L  97 


 


 03/20/17 08:00  03/20/17 08:00  03/20/17 08:00  03/20/17 08:00  03/20/17 08:00








 Laboratory Results





 03/20/17 03:25 





 











 03/19/17 03/20/17 03/21/17





 05:59 05:59 05:59


 


Intake Total  1520 


 


Balance  1520 














- Physical Exam


General Appearance: WD/WN


EENT: PERRL/EOMI


Respiratory: chest non-tender, coarse breath sounds


Neck: non-tender


Cardiac/Chest: normal peripheral pulses, regular rate, rhythm


Abdomen: normal bowel sounds, non-tender, soft


Skin: normal color


Neuro/Psych: oriented x 3





- Time Spent With Patient


Time Spent with Patient: greater than 35 minutes


Time Spent with Patient: Greater than 35 minutes spent on this patients care, 

greater than 50% of time spent counseling, educating, and coordinating care 

regarding the above mentioned plan.





- Pending Discharge


Pending Discharge Within 24 Hours: Yes


Pending Discharge Date: 03/21/17


Pending Discharge Time: 11:00





ICD10 Worksheet


Patient Problems: 


 Problems











Problem Status Onset


 


Hypoxia Acute  


 


Pneumonia Acute  


 


Sepsis Acute

## 2017-03-21 RX ADMIN — DIAZEPAM PRN MG: 5 TABLET ORAL at 10:54

## 2017-03-21 RX ADMIN — IPRATROPIUM BROMIDE AND ALBUTEROL SULFATE SCH ML: .5; 3 SOLUTION RESPIRATORY (INHALATION) at 17:01

## 2017-03-21 RX ADMIN — OXYCODONE HYDROCHLORIDE AND ACETAMINOPHEN SCH MG: 500 TABLET ORAL at 20:17

## 2017-03-21 RX ADMIN — METHOCARBAMOL SCH MG: 500 TABLET ORAL at 21:07

## 2017-03-21 RX ADMIN — OXYCODONE HYDROCHLORIDE AND ACETAMINOPHEN SCH MG: 500 TABLET ORAL at 10:44

## 2017-03-21 RX ADMIN — DIAZEPAM PRN MG: 5 TABLET ORAL at 04:21

## 2017-03-21 RX ADMIN — GUAIFENESIN SCH MG: 600 TABLET, EXTENDED RELEASE ORAL at 20:17

## 2017-03-21 RX ADMIN — IPRATROPIUM BROMIDE AND ALBUTEROL SULFATE SCH ML: .5; 3 SOLUTION RESPIRATORY (INHALATION) at 11:46

## 2017-03-21 RX ADMIN — BENZONATATE PRN MG: 100 CAPSULE, LIQUID FILLED ORAL at 21:06

## 2017-03-21 RX ADMIN — DIAZEPAM PRN MG: 5 TABLET ORAL at 23:30

## 2017-03-21 RX ADMIN — DIAZEPAM PRN MG: 5 TABLET ORAL at 17:35

## 2017-03-21 RX ADMIN — GUAIFENESIN SCH MG: 600 TABLET, EXTENDED RELEASE ORAL at 10:44

## 2017-03-21 RX ADMIN — METHOCARBAMOL SCH MG: 500 TABLET ORAL at 10:43

## 2017-03-21 RX ADMIN — ACETAMINOPHEN PRN MG: 325 TABLET ORAL at 04:22

## 2017-03-21 RX ADMIN — IPRATROPIUM BROMIDE AND ALBUTEROL SULFATE SCH ML: .5; 3 SOLUTION RESPIRATORY (INHALATION) at 05:20

## 2017-03-21 RX ADMIN — ENOXAPARIN SODIUM SCH: 100 INJECTION SUBCUTANEOUS at 10:47

## 2017-03-21 RX ADMIN — METHOCARBAMOL SCH MG: 500 TABLET ORAL at 17:34

## 2017-03-21 RX ADMIN — IPRATROPIUM BROMIDE AND ALBUTEROL SULFATE SCH ML: .5; 3 SOLUTION RESPIRATORY (INHALATION) at 21:38

## 2017-03-21 RX ADMIN — VITAMIN D, TAB 1000IU (100/BT) SCH UNITS: 25 TAB at 10:44

## 2017-03-21 RX ADMIN — IBUPROFEN PRN MG: 600 TABLET ORAL at 20:23

## 2017-03-21 RX ADMIN — IBUPROFEN PRN MG: 600 TABLET ORAL at 06:11

## 2017-03-21 RX ADMIN — BENZONATATE PRN MG: 100 CAPSULE, LIQUID FILLED ORAL at 04:26

## 2017-03-21 RX ADMIN — LIDOCAINE SCH EA: 50 PATCH TOPICAL at 10:44

## 2017-03-21 RX ADMIN — NICOTINE SCH: 14 PATCH TRANSDERMAL at 10:46

## 2017-03-21 NOTE — HOSPPROG
Hospitalist Progress Note


Assessment/Plan: 





# acute hypoxic resp failure - 85% on RA today


# diffuse infiltrates, cough, hypoxia - unclear if bacterial; consider CT if 

not improving clinically


   - cont levaquin, add pred, BDs


# COPD, chronic bronchitis


# anxiety - valium/hydroxyzine prn


# TBI 2002


# FCFT


# lovenox





##


new pt to me


CXR personally reviewed


chart reviewed





Subjective: still feels very SOB; desat'd off O2


Objective: 


 Vital Signs











Temp Pulse Resp BP Pulse Ox


 


 36.6 C   94   20   101/60   85 L


 


 03/21/17 06:59  03/21/17 11:46  03/21/17 11:46  03/21/17 11:19  03/21/17 11:46








 Laboratory Results





 03/20/17 03:25 





 











 03/20/17 03/21/17 03/22/17





 05:59 05:59 05:59


 


Intake Total 1520 850 


 


Balance 1520 850 








 











PT  14.4 SEC (12.0-15.0)   03/18/17  09:04    


 


INR  1.13  (0.83-1.16)   03/18/17  09:04    














- Physical Exam


Constitutional: other (slightly uncomfortable)


Cardiovascular: regular rate and rhythym, no murmur, rub, or gallop


Respiratory: reduced air movement, expiratory wheeze, respiratory distress (mild

), No rhonchi


Gastrointestinal: normoactive bowel sounds, soft, non-tender abdomen, other (

hernia, reducible)





ICD10 Worksheet


Patient Problems: 


 Problems











Problem Status Onset


 


Pneumonia Acute  


 


Hypoxia Acute  


 


Sepsis Acute

## 2017-03-22 LAB
% IMMATURE GRANULYOCYTES: 0.5 % (ref 0–1.1)
ABSOLUTE IMMATURE GRANULOCYTES: 0.04 10^3/UL (ref 0–0.1)
ABSOLUTE NRBC COUNT: 0 10^3/UL (ref 0–0.01)
ADD DIFF?: NO
ADD MORPH?: NO
ADD SCAN?: YES
ANION GAP SERPL CALC-SCNC: 11 MEQ/L (ref 8–16)
ATYPICAL LYMPHOCYTE FLAG: 160 (ref 0–99)
CALCIUM SERPL-MCNC: 9.1 MG/DL (ref 8.5–10.4)
CHLORIDE SERPL-SCNC: 104 MEQ/L (ref 97–110)
CO2 SERPL-SCNC: 23 MEQ/L (ref 22–31)
CREAT SERPL-MCNC: 0.5 MG/DL (ref 0.6–1)
ERYTHROCYTE [DISTWIDTH] IN BLOOD BY AUTOMATED COUNT: 13.1 % (ref 11.5–15.2)
FRAGMENT RBC FLAG: 0 (ref 0–99)
GFR SERPL CREATININE-BSD FRML MDRD: > 60 ML/MIN/{1.73_M2}
GLUCOSE SERPL-MCNC: 114 MG/DL (ref 70–100)
HCT VFR BLD CALC: 39.2 % (ref 38–47)
HGB BLD-MCNC: 12.8 G/DL (ref 12.6–16.3)
LEFT SHIFT FLG: 10 (ref 0–99)
LIPEMIA HEMOLYSIS FLAG: 80 (ref 0–99)
MCH RBC BLDCO QN: 28.8 PG (ref 27.9–34.1)
MCHC RBC AUTO-ENTMCNC: 32.7 G/DL (ref 32.4–36.7)
MCV RBC AUTO: 88.1 FL (ref 81.5–99.8)
NEUTS VAC BLD QL SMEAR: PRESENT
NRBC-AUTO%: 0 % (ref 0–0.2)
PLATELET # BLD EST: ADEQUATE 10*3/UL
PLATELET # BLD: 354 10^3/UL (ref 150–400)
PLATELET CLUMPS FLAG: 0 (ref 0–99)
PMV BLD AUTO: 9.9 FL (ref 8.7–11.7)
POTASSIUM SERPL-SCNC: 4.2 MEQ/L (ref 3.5–5.2)
RBC # BLD AUTO: 4.45 10^6/UL (ref 4.18–5.33)
RBC MORPH BLD: NORMAL
SCAN: POSITIVE
SODIUM SERPL-SCNC: 138 MEQ/L (ref 134–144)

## 2017-03-22 RX ADMIN — LIDOCAINE SCH EA: 50 PATCH TOPICAL at 09:01

## 2017-03-22 RX ADMIN — VITAMIN D, TAB 1000IU (100/BT) SCH UNITS: 25 TAB at 08:58

## 2017-03-22 RX ADMIN — GUAIFENESIN SCH MG: 600 TABLET, EXTENDED RELEASE ORAL at 08:57

## 2017-03-22 RX ADMIN — METHOCARBAMOL SCH MG: 500 TABLET ORAL at 21:01

## 2017-03-22 RX ADMIN — IPRATROPIUM BROMIDE AND ALBUTEROL SULFATE SCH ML: .5; 3 SOLUTION RESPIRATORY (INHALATION) at 11:44

## 2017-03-22 RX ADMIN — IPRATROPIUM BROMIDE AND ALBUTEROL SULFATE SCH ML: .5; 3 SOLUTION RESPIRATORY (INHALATION) at 16:00

## 2017-03-22 RX ADMIN — NICOTINE SCH: 14 PATCH TRANSDERMAL at 09:26

## 2017-03-22 RX ADMIN — OXYCODONE HYDROCHLORIDE AND ACETAMINOPHEN SCH MG: 500 TABLET ORAL at 21:00

## 2017-03-22 RX ADMIN — IBUPROFEN PRN MG: 600 TABLET ORAL at 16:25

## 2017-03-22 RX ADMIN — GUAIFENESIN AND DEXTROMETHORPHAN PRN ML: 100; 10 SYRUP ORAL at 21:01

## 2017-03-22 RX ADMIN — OXYCODONE HYDROCHLORIDE AND ACETAMINOPHEN SCH MG: 500 TABLET ORAL at 08:57

## 2017-03-22 RX ADMIN — METHOCARBAMOL SCH MG: 500 TABLET ORAL at 16:25

## 2017-03-22 RX ADMIN — METHOCARBAMOL SCH MG: 500 TABLET ORAL at 08:57

## 2017-03-22 RX ADMIN — IPRATROPIUM BROMIDE AND ALBUTEROL SULFATE SCH ML: .5; 3 SOLUTION RESPIRATORY (INHALATION) at 20:33

## 2017-03-22 RX ADMIN — ENOXAPARIN SODIUM SCH MG: 100 INJECTION SUBCUTANEOUS at 10:36

## 2017-03-22 RX ADMIN — IPRATROPIUM BROMIDE AND ALBUTEROL SULFATE SCH ML: .5; 3 SOLUTION RESPIRATORY (INHALATION) at 05:37

## 2017-03-22 RX ADMIN — DIAZEPAM PRN MG: 5 TABLET ORAL at 09:00

## 2017-03-22 RX ADMIN — GUAIFENESIN AND DEXTROMETHORPHAN PRN ML: 100; 10 SYRUP ORAL at 10:36

## 2017-03-22 RX ADMIN — DIAZEPAM PRN MG: 5 TABLET ORAL at 21:01

## 2017-03-22 RX ADMIN — DIAZEPAM PRN MG: 5 TABLET ORAL at 14:46

## 2017-03-22 NOTE — HOSPPROG
Hospitalist Progress Note


Assessment/Plan: 








# acute hypoxic resp failure - ongoing, still requiring O2


# bilat upper lobe ground glass, has not resolved with abx


   - appreciate Dr Ibanez's consult - he will bronch today for dx


   - check histo, resp viral pcr, HIV


# COPD, chronic bronchitis


# anxiety - valium/hydroxyzine prn


# TBI 2002


# FCFT


# lovenox





##


discussed with Dr Ibanez - he will consult


CT reviewed, personally interpreted








Subjective: still SOB; henria painful with coughing


Objective: 


 Vital Signs











Temp Pulse Resp BP Pulse Ox


 


 36.8 C   61   16   108/68   97 


 


 03/22/17 07:17  03/22/17 07:17  03/22/17 07:17  03/22/17 07:17  03/22/17 07:17








 Laboratory Results





 03/22/17 06:10 





 











 03/21/17 03/22/17 03/23/17





 05:59 05:59 05:59


 


Intake Total 850 500 


 


Balance 850 500 








 











PT  14.4 SEC (12.0-15.0)   03/18/17  09:04    


 


INR  1.13  (0.83-1.16)   03/18/17  09:04    














- Physical Exam


Constitutional: no apparent distress, appears nourished


Cardiovascular: regular rate and rhythym, no murmur, rub, or gallop, systolic 

murmur


Respiratory: no rales or rhonchi, inspiratory crackles (upper lobes), 

respiratory distress (mild)


Gastrointestinal: normoactive bowel sounds, other





ICD10 Worksheet


Patient Problems: 


 Problems











Problem Status Onset


 


Hypoxia Acute  


 


Pneumonia Acute  


 


Sepsis Acute

## 2017-03-23 RX ADMIN — DIAZEPAM PRN MG: 5 TABLET ORAL at 16:07

## 2017-03-23 RX ADMIN — OXYCODONE HYDROCHLORIDE AND ACETAMINOPHEN SCH MG: 500 TABLET ORAL at 10:07

## 2017-03-23 RX ADMIN — GUAIFENESIN AND DEXTROMETHORPHAN PRN ML: 100; 10 SYRUP ORAL at 10:42

## 2017-03-23 RX ADMIN — METHOCARBAMOL SCH MG: 500 TABLET ORAL at 10:06

## 2017-03-23 RX ADMIN — METHOCARBAMOL SCH MG: 500 TABLET ORAL at 16:07

## 2017-03-23 RX ADMIN — IBUPROFEN PRN MG: 600 TABLET ORAL at 10:48

## 2017-03-23 RX ADMIN — OXYCODONE HYDROCHLORIDE AND ACETAMINOPHEN SCH MG: 500 TABLET ORAL at 22:21

## 2017-03-23 RX ADMIN — LIDOCAINE SCH EA: 50 PATCH TOPICAL at 10:44

## 2017-03-23 RX ADMIN — METHOCARBAMOL SCH MG: 500 TABLET ORAL at 22:20

## 2017-03-23 RX ADMIN — IPRATROPIUM BROMIDE AND ALBUTEROL SULFATE SCH ML: .5; 3 SOLUTION RESPIRATORY (INHALATION) at 20:55

## 2017-03-23 RX ADMIN — IBUPROFEN PRN MG: 600 TABLET ORAL at 22:19

## 2017-03-23 RX ADMIN — IPRATROPIUM BROMIDE AND ALBUTEROL SULFATE SCH ML: .5; 3 SOLUTION RESPIRATORY (INHALATION) at 06:12

## 2017-03-23 RX ADMIN — VITAMIN D, TAB 1000IU (100/BT) SCH UNITS: 25 TAB at 10:06

## 2017-03-23 RX ADMIN — DIAZEPAM PRN MG: 5 TABLET ORAL at 10:13

## 2017-03-23 RX ADMIN — DIAZEPAM PRN MG: 5 TABLET ORAL at 22:20

## 2017-03-23 RX ADMIN — IPRATROPIUM BROMIDE AND ALBUTEROL SULFATE SCH ML: .5; 3 SOLUTION RESPIRATORY (INHALATION) at 10:15

## 2017-03-23 RX ADMIN — IPRATROPIUM BROMIDE AND ALBUTEROL SULFATE SCH ML: .5; 3 SOLUTION RESPIRATORY (INHALATION) at 16:51

## 2017-03-23 RX ADMIN — ENOXAPARIN SODIUM SCH MG: 100 INJECTION SUBCUTANEOUS at 10:07

## 2017-03-23 RX ADMIN — NICOTINE SCH: 14 PATCH TRANSDERMAL at 07:23
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
## 2017-03-23 NOTE — GCON
[f rep st]



                                                                    CONSULTATION





DATE OF CONSULTATION:  03/22/2017



REASON FOR CONSULTATION:  I was asked to see this patient due to persistent infiltrates and what may
 be recurring pneumonia.



HISTORY OF PRESENT ILLNESS:  She is a 47-year-old female who is homeless and recently moved here [facility] due to personal stresses.  In any case, she apparently has had multiple hospitalizations
 in the last 6 weeks, each treated for pneumonia.  Interestingly, on her chest x-rays, her infiltrat
es appear to have changed somewhat.  On this admission, she was diagnosed with community-acquired pn
eumonia, treated with Levaquin and oxygen as well as prednisone and appeared to be relatively improv
ed; however, further investigation revealed the persistent infiltrates.  A CT scan was performed rev
ealing diffuse ground-glass infiltrates in the upper lobes more so than the lower lobes but bilatera
lly certainly as well and no effusions.  I was asked to comment on this and whether or not a broncho
scopy would be useful.  The patient did report fevers on admission as well as shortness of breath an
d ongoing smoking despite a diagnosis of COPD.  In any case, she had a normal appetite and denied an
y unanticipated weight loss.  There has been no hemoptysis and no history of renal disease.  No Rayn
aud phenomenon.  No excessive joint problems and no history of connective tissue disease.



REVIEW OF SYSTEMS:  Otherwise negative.



PAST MEDICAL HISTORY:  

1.  COPD. 

2.  Traumatic brain injury.  

3.  Gastroesophageal reflux disease.  

4.  Anxiety.  

5.  Chronic lower extremity edema.



PAST SURGICAL HISTORY:  

1.  Cholecystectomy. 

2.  Partial hysterectomy. 

3.  Lipoma resection in the past.



SOCIAL HISTORY:  She denies any alcohol but is an ongoing smoker.  No IV drug use and is homeless.



FAMILY HISTORY:  Lacks connective tissue disease.



CURRENT MEDICATIONS:  Include Tylenol, albuterol as needed, scheduled DuoNeb, vitamins C and D, Robin
um, Lovenox, Robitussin, Motrin, Levaquin, Robaxin, Zofran, and prednisone 40 mg daily which was sta
rted earlier on the 21st.



PHYSICAL EXAMINATION:  VITAL SIGNS:  She was afebrile at 37, respiratory rate of 18, heart rate of 8
5, blood pressure 115/77, oxygen saturation 94% on 2 L.  GENERAL:  She was a mild-to-moderately obes
e woman who was awake and alert in no apparent distress and able to speak in full sentences without 
using accessory muscles for breathing.  HEENT:  Pupils are equally round, reactive to light, noninje
cted, and nonicteric.  Mucous membranes were moist without erythema or exudate.  She was edentulous.
  NECK:  Supple without adenopathy or jugular vein distention.  RESPIRATORY:  Breath sounds revealed
 mild rales at the bases but was primarily clear to auscultation without wheezing.  HEART:  Had a re
gular rate and rhythm without obvious murmur.  ABDOMEN:  Soft, nontender, nondistended without hepat
osplenomegaly.  EXTREMITIES:  Showed no clubbing, cyanosis, or edema.  NEUROLOGIC:  Nonfocal includi
ng cranial nerves and deep tendon reflexes.  SKIN:  Warm and dry without obvious rashes.



OBJECTIVE DATA:  Particularly her chest CT scan which shows waxing and waning bilateral ground-glass
 pulmonary infiltrates that, as I said, were primarily in the upper lobes.  There was a noncalcified
 4 mm nodule in the right lower lobe as well as a 7 mm nodule in the left upper lobe, a 3 cm probabl
e thyroid nodule displacing the trachea to the left.  Her white count on admission was 12, went up t
o 13.7 and, at the time of consultation, was 7.4; hematocrit was 39; platelets of 354.  Basic metabo
lic panel was unremarkable.  An HIV test was negative.



ASSESSMENT/PLAN:  

1.  Fleeting infiltrates in a patient who has probable pneumonia.  It is hard to say what this actua
lly represents at this time, and I did agree that a bronchoalveolar lavage would be useful in [facility] to look for evidence of potential fungal disease such as coccidioidomycosis which is uncommon c
ertainly in this area but also to help look for pulmonary eosinophilia which would be consistent wit
h acute eosinophilic pneumonitis.  Connective tissue disease, cryptogenic organizing pneumonia, or h
ypersensitivity pneumonitis are all in the differential as well as aspiration which is high on my li
st of possibilities.  In either case, she was treated with antibiotics and steroids and seems to be 
getting better.  I suggested to the patient a bronchoalveolar lavage, but once she heard the risks, 
she declined that test at this time.  Though I thought this was not unreasonable, it was not my abrahan
mmended approach to therapy.  In either case, she will continue on oxygen and her current medication
s, and as a suboptimal alternative to a bronchoscopy at this time, a repeat CT PET scan in 1-2 month
s would be reasonable as long as she continues to improve.  I agree with a serologic workup which in
cludes histoplasmosis antigen as well as an MG, and we might consider ANCA and antibodies as well. 
 

2.  Chronic obstructive pulmonary disease:  This is a clinical diagnosis.  The nebulizers are perfec
tly fine.  She should probably confirm this diagnosis with pulmonary function testing at some point.
  The prednisone that is present time can likely be withheld. 

3.  Hypoxemia:  This is likely due to #1, and I would simply recommend ongoing pulmonary toilet and 
titrating the oxygen to a saturation of greater than 90%.





Job #:  [id number]

## 2017-03-23 NOTE — HOSPPROG
Hospitalist Progress Note


Assessment/Plan: 





47F, 3rd hospitalization in 6 weeks for resp failure.  Failed treatment 

previously.  Unclear if this is bacterial, ILD, other.  Refused bronch.  

Showing slow improvement.  Will involve ID today.





# acute hypoxic resp failure - ongoing, still requiring O2


# bilat upper lobe ground glass, has not resolved with abx;


   - patient refused bronch


   - check histo, resp viral pcr, HIV, sputum cx


   - cont levaquin/prednisone for now


   - ID consult, will also discuss with Dr Ibanez


# thyroid nodule - patient aware - refusing further w/u at this time


# anxiety - valium/hydroxyzine prn


# TBI 2002


# FCFT


# lovenox





##


mod risk with ongoing acute hypoxia








Subjective: breathing feels lsightly better today


Objective: 


 Vital Signs











Temp Pulse Resp BP Pulse Ox


 


 36.8 C   62   20   112/65   96 


 


 03/23/17 08:00  03/23/17 08:00  03/23/17 08:00  03/23/17 08:00  03/23/17 08:00








 Laboratory Results





 03/22/17 06:10 





 03/22/17 06:10 





 











 03/22/17 03/23/17 03/24/17





 05:59 05:59 05:59


 


Intake Total 500 1000 


 


Output Total  200 


 


Balance 500 800 








 











PT  14.4 SEC (12.0-15.0)   03/18/17  09:04    


 


INR  1.13  (0.83-1.16)   03/18/17  09:04    














- Physical Exam


Constitutional: no apparent distress, appears nourished


Cardiovascular: regular rate and rhythym, no murmur, rub, or gallop, systolic 

murmur


Respiratory: other (mild resp distress; bilat upper lung rales; mild ezpir 

wheezes; no rhonchi)


Gastrointestinal: normoactive bowel sounds, soft, non-tender abdomen, no 

palpable masses, other (hernia, reducible)





ICD10 Worksheet


Patient Problems: 


 Problems











Problem Status Onset


 


Hypoxia Acute  


 


Pneumonia Acute  


 


Sepsis Acute

## 2017-03-23 NOTE — PDINTPN
Intensivist Progress Note


Assessment/Plan: 


Assessment/plan:





47 F with waxing and waning infiltrates on imaging studies despite several 

courses of antibiotics for pneumonia. Her CT scan on this admission showed 

diffuse bilateral, upper lobe predominant reticular infiltrates with minor MARY, 

but no fibrotic changes or honeycombing or traction bronchiectasis. I agreeed 

with bronchoscopy for further clarification of her illness, but she declined. 





* Abnormal CT- the DDx includes hypersensitivity pneumonitis, cryptogenic 

organizing pneumonia, acute eosinophilic pneumonitis, eosinophilic 

granulomatosis, sarcoid and aspiration pneumonitis. I favor the latter, but the 

lack of bronchoscopy makes it difficult to be definitive. I discussed the risks 

and benefits of bronch at length on 3/22, but she re-iterated her fears today. 

This is not an unreasonable perspective and some of the possibilities are less 

likely (eg EG since no peripheral eos). Agree with MG, CTD work-up and she 

should have a repeat CT in 1-2 months. Since recurrent CAP is also a possibility

, I would complete 5-7 days of Levaquin, but would of course defer to ID. Of 

note, this would be an excellent application for procalcitonin once it is 

available to our institution. 


* COPD- this is a preliminary diagnosis in the absence of PFTs. I assume her 

systemic steroids are for this, which I would favor stopping for now and using 

nebs/MDIs prn. She will eventually need outpatient PFTs. 


* 














Subjective: 





Stable overnight and reports improved symptoms today- no cough, hemoptysis, 

sob. Re-iterated fear of BAL


Objective: 





 Vital Signs











Temp Pulse Resp BP Pulse Ox


 


 36.8 C   74   16   112/65   93 


 


 03/23/17 08:00  03/23/17 10:17  03/23/17 10:17  03/23/17 08:00  03/23/17 10:17








 Microbiology











 03/23/17 10:20  - Final





 Sputum, Induced/Suctioned 








 Laboratory Results





 03/22/17 06:10 





 03/22/17 06:10 





 











 03/22/17 03/23/17 03/24/17





 05:59 05:59 05:59


 


Intake Total 500 1000 240


 


Output Total  200 


 


Balance 500 800 240








 











PT  14.4 SEC (12.0-15.0)   03/18/17  09:04    


 


INR  1.13  (0.83-1.16)   03/18/17  09:04    














Physical Exam





- Physical Exam


General Appearance: alert, no apparent distress, obese


EENT: other (edentulous)


Neck: full range of motion, supple


Respiratory: lungs clear, normal breath sounds, No respiratory distress, No 

crackles


Cardiac/Chest: normal peripheral pulses, regular rate, rhythm, No edema


Abdomen: normal bowel sounds, non-tender, soft, No distended


Skin: normal color, warm/dry


Lymphatic: no adenopathy


Extremities: non-tender, No pedal edema


Neuro/Psych: alert, normal mood/affect, oriented x 3





ICD10 Worksheet


Patient Problems: 


 Problems











Problem Status Onset


 


Hypoxia Acute  


 


Pneumonia Acute  


 


Sepsis Acute

## 2017-03-24 VITALS — HEART RATE: 67 BPM | RESPIRATION RATE: 18 BRPM

## 2017-03-24 VITALS — TEMPERATURE: 98.4 F | DIASTOLIC BLOOD PRESSURE: 76 MMHG | SYSTOLIC BLOOD PRESSURE: 111 MMHG

## 2017-03-24 VITALS — OXYGEN SATURATION: 91 %

## 2017-03-24 LAB
% IMMATURE GRANULYOCYTES: 0.6 % (ref 0–1.1)
ABSOLUTE IMMATURE GRANULOCYTES: 0.04 10^3/UL (ref 0–0.1)
ABSOLUTE NRBC COUNT: 0 10^3/UL (ref 0–0.01)
ADD DIFF?: NO
ADD MORPH?: NO
ADD SCAN?: YES
ANION GAP SERPL CALC-SCNC: 10 MEQ/L (ref 8–16)
ATYPICAL LYMPHOCYTE FLAG: 300 (ref 0–99)
CALCIUM SERPL-MCNC: 10 MG/DL (ref 8.5–10.4)
CHLORIDE SERPL-SCNC: 104 MEQ/L (ref 97–110)
CO2 SERPL-SCNC: 25 MEQ/L (ref 22–31)
CREAT SERPL-MCNC: 0.5 MG/DL (ref 0.6–1)
ERYTHROCYTE [DISTWIDTH] IN BLOOD BY AUTOMATED COUNT: 13.1 % (ref 11.5–15.2)
FRAGMENT RBC FLAG: 0 (ref 0–99)
GFR SERPL CREATININE-BSD FRML MDRD: > 60 ML/MIN/{1.73_M2}
GLUCOSE SERPL-MCNC: 96 MG/DL (ref 70–100)
HCT VFR BLD CALC: 37.6 % (ref 38–47)
HGB BLD-MCNC: 12.1 G/DL (ref 12.6–16.3)
HYPOCHROMIA BLD QL SMEAR: (no result)
LEFT SHIFT FLG: 0 (ref 0–99)
LIPEMIA HEMOLYSIS FLAG: 80 (ref 0–99)
MCH RBC BLDCO QN: 28.5 PG (ref 27.9–34.1)
MCHC RBC AUTO-ENTMCNC: 32.2 G/DL (ref 32.4–36.7)
MCV RBC AUTO: 88.7 FL (ref 81.5–99.8)
NRBC-AUTO%: 0 % (ref 0–0.2)
PLATELET # BLD EST: ADEQUATE 10*3/UL
PLATELET # BLD: 412 10^3/UL (ref 150–400)
PLATELET CLUMPS FLAG: 0 (ref 0–99)
PMV BLD AUTO: 9.5 FL (ref 8.7–11.7)
POTASSIUM SERPL-SCNC: 3.7 MEQ/L (ref 3.5–5.2)
RBC # BLD AUTO: 4.24 10^6/UL (ref 4.18–5.33)
RESPPCR RESULT: (no result)
SCAN: POSITIVE
SODIUM SERPL-SCNC: 139 MEQ/L (ref 134–144)

## 2017-03-24 RX ADMIN — METHOCARBAMOL SCH MG: 500 TABLET ORAL at 08:39

## 2017-03-24 RX ADMIN — LIDOCAINE SCH EA: 50 PATCH TOPICAL at 08:39

## 2017-03-24 RX ADMIN — IBUPROFEN PRN MG: 600 TABLET ORAL at 11:23

## 2017-03-24 RX ADMIN — GUAIFENESIN AND DEXTROMETHORPHAN PRN ML: 100; 10 SYRUP ORAL at 08:40

## 2017-03-24 RX ADMIN — IPRATROPIUM BROMIDE AND ALBUTEROL SULFATE SCH ML: .5; 3 SOLUTION RESPIRATORY (INHALATION) at 11:11

## 2017-03-24 RX ADMIN — NICOTINE SCH: 14 PATCH TRANSDERMAL at 10:24

## 2017-03-24 RX ADMIN — DIAZEPAM PRN MG: 5 TABLET ORAL at 05:02

## 2017-03-24 RX ADMIN — DIAZEPAM PRN MG: 5 TABLET ORAL at 11:19

## 2017-03-24 RX ADMIN — OXYCODONE HYDROCHLORIDE AND ACETAMINOPHEN SCH MG: 500 TABLET ORAL at 08:39

## 2017-03-24 RX ADMIN — VITAMIN D, TAB 1000IU (100/BT) SCH UNITS: 25 TAB at 08:39

## 2017-03-24 RX ADMIN — METHOCARBAMOL SCH MG: 500 TABLET ORAL at 15:06

## 2017-03-24 RX ADMIN — IPRATROPIUM BROMIDE AND ALBUTEROL SULFATE SCH ML: .5; 3 SOLUTION RESPIRATORY (INHALATION) at 05:43

## 2017-03-24 NOTE — PCMIDPN
Assessment/Plan: 


Assessment:


influenza B by respiratory viral panel PCR.  Started on oseltamivir.  Patient 

is probably stable enough for completion of therapy as on outpatient.  Follow 

up with PCP clinic in 7-10 days. 








Plan:


1) Oseltamivir 75 mg po BID x 5 days.


2) Stable for discharge.











03/24/17 11:51





03/24/17 11:53





Subjective: 





Patient doing well.  Breathing is improved.  No new complaints.  No fevers.  

Occasional productive cough.


Objective: 


oseltamivir #1 Vital Signs











Temp Pulse Resp BP Pulse Ox


 


 36.9 C   67   18   111/76   97 


 


 03/24/17 08:25  03/24/17 11:13  03/24/17 11:13  03/24/17 08:25  03/24/17 11:13








 Microbiology











 03/23/17 10:20  - Final





 Sputum, Induced/Suctioned 








 Laboratory Results





 03/24/17 05:10 





 03/24/17 05:10 





 











 03/23/17 03/24/17 03/25/17





 05:59 05:59 05:59


 


Intake Total 1000 2080 


 


Output Total 200  


 


Balance 800 2080 














- Physical Exam


General Appearance: WD/WN, alert, no apparent distress, non-toxic


Respiratory: lungs clear, normal breath sounds, No respiratory distress


Cardiac/Chest: regular rate, rhythm, No tachycardia


Skin: normal color, warm/dry, No rash


Neuro/Psych: alert, normal mood/affect, oriented x 3





ICD10 Worksheet


Patient Problems: 


 Problems











Problem Status Onset


 


Hypoxia Acute  


 


Pneumonia Acute  


 


Sepsis Acute

## 2017-03-24 NOTE — GDS
[f rep st]



                                                             DISCHARGE SUMMARY





DISCHARGE DIAGNOSES:  

1.  Acute hypoxemic respiratory failure, most likely due to influenza B.

2.  Bilateral upper lobe ground-glass opacities, possibly due to flu versus other.

3.  Thyroid nodule.

4.  Anxiety.

5.  Traumatic brain injury.

6.  Tobacco dependence.



CONSULTANTS:  

1.  Dr. Dyllan Ibanez, Pulmonology.

2.  Dr. Hoang Parmar, Infectious Disease.



HOSPITAL COURSE AND STAY BY PROBLEM:  

1.  Acute respiratory failure:  The patient was admitted to the hospital.  A CT of the chest was don
e on 03/21/2017, that showed chronic waxing and waning bilateral ground-glass pulmonary opacities.  
Blood cultures have been negative.  Influenza A and B by DFA was negative on admission.  However, re
spiratory viral panel done on 03/22/2017, returned positive for influenza B.

2.  During the patient's hospital course, bronchoscopy was recommended to the patient by Pulmonology
 to help further define her diagnosis.  Bronchoscopy was refused by the patient.  On day of discharg
e, the patient states she is feeling better.  She has finished a 5-day course of levofloxacin, which
 will be stopped.  She has been started on Tamiflu to complete a 10-day course.

3.  3 cm superior mediastinal nodule potentially emanating from the right thyroid lobe:  The patient
 was informed of this finding and did not want any further workup of this.



PHYSICAL EXAM ON DISCHARGE:  VITAL SIGNS:  Blood pressure 111/76, pulse 59, respiratory rate 14, O2 
saturation 98% on 2 L.  Temperature afebrile.  GENERAL:  In no acute distress.  HEART:  S1, S2.  SEBLE
GS:  Clear.  ABDOMEN:  Soft.  EXTREMITIES:  No edema.



PERTINENT LABS AND STUDIES:  Chest CT done 03/21/2017, refer to report.



DISCHARGE MEDICATIONS:  Please refer to discharge medication reconciliation in [facility] for full det
ails.  Below is a preliminary list. 

NEW MEDICATIONS ON HOSPITAL DISCHARGE:  Tamiflu 75 mg p.o. b.i.d. to complete a 5-day course.



DISCHARGE INSTRUCTIONS:  The patient will be discharged from the hospital, where she should complete
 her 5-day course of Tamiflu.  She should have outpatient followup of her possible thyroid nodule.  
She will also follow up with Dr. Ibanez from Pulmonology for PFTs, and should have a repeat chest CT 
in 1 to 2 months.



LABS PENDING ON DAY OF DISCHARGE:  MG is pending. 



The patient continues to have waxing and waning infiltrates.  It would be prudent to evaluate her fo
r Lalit disease with an ANCA panel. 



Greater than 30 minutes were spent on the discharge of this patient.





Job #:  [id number]

## 2017-06-04 ENCOUNTER — HOSPITAL ENCOUNTER (INPATIENT)
Dept: HOSPITAL 80 - FED | Age: 47
LOS: 9 days | Discharge: HOME | DRG: 166 | End: 2017-06-13
Attending: INTERNAL MEDICINE | Admitting: HOSPITALIST
Payer: MEDICAID

## 2017-06-04 DIAGNOSIS — Z59.0: ICD-10-CM

## 2017-06-04 DIAGNOSIS — J95.821: ICD-10-CM

## 2017-06-04 DIAGNOSIS — R91.8: Primary | ICD-10-CM

## 2017-06-04 DIAGNOSIS — K42.0: ICD-10-CM

## 2017-06-04 DIAGNOSIS — J44.1: ICD-10-CM

## 2017-06-04 DIAGNOSIS — R74.0: ICD-10-CM

## 2017-06-04 DIAGNOSIS — F17.210: ICD-10-CM

## 2017-06-04 LAB
% IMMATURE GRANULYOCYTES: 0.4 % (ref 0–1.1)
ABSOLUTE IMMATURE GRANULOCYTES: 0.06 10^3/UL (ref 0–0.1)
ABSOLUTE NRBC COUNT: 0 10^3/UL (ref 0–0.01)
ADD DIFF?: NO
ADD MORPH?: NO
ADD SCAN?: YES
ANION GAP SERPL CALC-SCNC: 10 MEQ/L (ref 8–16)
ATYPICAL LYMPHOCYTE FLAG: 0 (ref 0–99)
CALCIUM SERPL-MCNC: 8.9 MG/DL (ref 8.5–10.4)
CHLORIDE SERPL-SCNC: 108 MEQ/L (ref 97–110)
CO2 SERPL-SCNC: 21 MEQ/L (ref 22–31)
CREAT SERPL-MCNC: 0.6 MG/DL (ref 0.6–1)
ERYTHROCYTE [DISTWIDTH] IN BLOOD BY AUTOMATED COUNT: 15.5 % (ref 11.5–15.2)
FRAGMENT RBC FLAG: 0 (ref 0–99)
GFR SERPL CREATININE-BSD FRML MDRD: > 60 ML/MIN/{1.73_M2}
GLUCOSE SERPL-MCNC: 98 MG/DL (ref 70–100)
HCT VFR BLD CALC: 37 % (ref 38–47)
HGB BLD-MCNC: 12.1 G/DL (ref 12.6–16.3)
LEFT SHIFT FLG: 10 (ref 0–99)
LIPEMIA HEMOLYSIS FLAG: 80 (ref 0–99)
MCH RBC BLDCO QN: 28.4 PG (ref 27.9–34.1)
MCHC RBC AUTO-ENTMCNC: 32.7 G/DL (ref 32.4–36.7)
MCV RBC AUTO: 86.9 FL (ref 81.5–99.8)
NRBC-AUTO%: 0 % (ref 0–0.2)
PLATELET # BLD: 202 10^3/UL (ref 150–400)
PLATELET CLUMPS FLAG: 300 (ref 0–99)
PMV BLD AUTO: 10.2 FL (ref 8.7–11.7)
POTASSIUM SERPL-SCNC: 3.4 MEQ/L (ref 3.5–5.2)
RBC # BLD AUTO: 4.26 10^6/UL (ref 4.18–5.33)
SCAN: NEGATIVE
SODIUM SERPL-SCNC: 139 MEQ/L (ref 134–144)

## 2017-06-04 PROCEDURE — G0472 HEP C SCREEN HIGH RISK/OTHER: HCPCS

## 2017-06-04 PROCEDURE — G0480 DRUG TEST DEF 1-7 CLASSES: HCPCS

## 2017-06-04 PROCEDURE — P9041 ALBUMIN (HUMAN),5%, 50ML: HCPCS

## 2017-06-04 RX ADMIN — AZITHROMYCIN SCH MG: 250 TABLET, FILM COATED ORAL at 23:04

## 2017-06-04 RX ADMIN — METHOCARBAMOL SCH MG: 500 TABLET ORAL at 23:04

## 2017-06-04 RX ADMIN — ONDANSETRON PRN MG: 4 TABLET, ORALLY DISINTEGRATING ORAL at 23:04

## 2017-06-04 SDOH — ECONOMIC STABILITY - HOUSING INSECURITY: HOMELESSNESS: Z59.0

## 2017-06-04 NOTE — PDGENHP
History and Physical





- Chief Complaint


fever, SOB





- History of Present Illness


46 yo female with h/o COPD, anxiety and chronic, waxing and waning b/l 

pulmonary infiltrates presents to the ED with fever and SOB.  She was admitted 

in February for PNA after failing outpatient treatment with Levaquin.  She was 

admitted again in March 2017 with recurring b/l infiltrates and tested positive 

for Influenza B at that time.  She was again treated with Levaquin.  CT imaging 

suggested a broad differential and Pulmonary and ID consults were obtained.  

Bronchoscopy was recommended, but the patient declined this as the PARQ 

discussion scared her.  Workup at that time included a negative histoplasma Ag, 

negative MG, negative blastomyces Ab and negative HIV.  She denies IVDA.


She returns today reporting fever to 103 last night.  She complains of 

productive cough and SOB.  She saw an outpatient doctor yesterday and was again 

started on a course of Levaquin.  Her symptoms worsened and she came to the ED.

  She was given 1g IV Ceftriaxone in the ED and she is admitted for further 

management.





History Information





- Allergies/Home Medication List


Allergies/Adverse Reactions: 








droperidol [From Inapsine] Allergy (Severe, Verified 02/10/17 20:04)


 SEIZURES


haloperidol [From Haldol] Allergy (Severe, Verified 02/10/17 20:04)


 SEIZURES


haloperidol lactate [From Haldol] Allergy (Severe, Verified 02/10/17 20:04)


 SEIZURES





Home Medications: 








Albuterol [Proventil Inhaler HFA (*)] 1 - 2 puffs IH Q4H 06/04/17 [Last Taken 

Unknown]


Ascorbic Acid [Vitamin C 500 mg (*)] 500 mg PO BID 06/04/17 [Last Taken Unknown]


Cholecalciferol Vit D3 [Vitamin D3 (*)] 1,000 units PO DAILY 06/04/17 [Last 

Taken Unknown]


Herbals/Supplements -Info Only 1 ea PO DAILY 06/04/17 [Last Taken 06/04/17]


Methocarbamol 500 mg PO TID 06/04/17 [Last Taken 06/01/17]


hydrOXYzine HCL [Vistaril] 50 mg PO QID PRN 06/04/17 [Last Taken Unknown]


levOFLOXACIN [levAQUIN (*)] 750 mg PO DAILY 06/04/17 [Last Taken 06/04/17]


traMADol [Ultram 50 mg (*)] 50 mg PO Q6 PRN 06/04/17 [Last Taken 06/01/17]





I have personally reviewed and updated: family history, medical history, social 

history, surgical history





- Past Medical History


Additional medical history: COPD.  GERD/hiatal hernia.  h/o closed traumatic 

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 

headaches.  anxiety disorder





- Surgical History


Additional surgical history: partial hysterectomy.  cholecystectomy.  lipoma 

resection





- Family History


Additional family history: mom had dementia.  dad had emphysema





- Social History


Smoking Status: Current every day smoker


Tobacco Use: Less than 1 pack/day


Alcohol Use: None


Drug Use: None


Additional social history: Patient is originally from [place], left there in 

January and has been homeless/travelling since that time. She has remained in 

the [place] area since end of Jan/early February, now resides in [place] 

shelter. She reports having siblings and children in the [place] area.





Review of Systems


ROS: 10pt was reviewed & negative except for what was stated in HPI & below





Physical Exam














Temp Pulse Resp BP Pulse Ox


 


 36.7 C   88   20   115/83 H  98 


 


 06/04/17 21:40  06/04/17 21:40  06/04/17 21:40  06/04/17 21:40  06/04/17 21:40











Constitutional: no apparent distress


Eyes: PERRL


Ears, Nose, Mouth, Throat: moist mucous membranes


Cardiovascular: regular rate and rhythym


Respiratory: no respiratory distress, reduced air movement, other (b/l diffuse 

inspiratory and expiratory wheezes)


Gastrointestinal: normoactive bowel sounds, other (soft, nd, +TTP epigastrium, +

guarding, no rigidity or peritoneal signs)


Skin: warm


Musculoskeletal: full muscle strength


Neurologic: AAOx3


Psychiatric: anxious, poor insight





Lab Data & Imaging Review





 06/04/17 17:37





 06/04/17 17:37














WBC  16.03 10^3/uL (3.80-9.50)  H  06/04/17  17:37    


 


RBC  4.26 10^6/uL (4.18-5.33)   06/04/17  17:37    


 


Hgb  12.1 g/dL (12.6-16.3)  L  06/04/17  17:37    


 


Hct  37.0 % (38.0-47.0)  L  06/04/17  17:37    


 


MCV  86.9 fL (81.5-99.8)   06/04/17  17:37    


 


MCH  28.4 pg (27.9-34.1)   06/04/17  17:37    


 


MCHC  32.7 g/dL (32.4-36.7)   06/04/17  17:37    


 


RDW  15.5 % (11.5-15.2)  H  06/04/17  17:37    


 


Plt Count  202 10^3/uL (150-400)   06/04/17  17:37    


 


MPV  10.2 fL (8.7-11.7)   06/04/17  17:37    


 


Neut % (Auto)  83.7 % (39.3-74.2)  H  06/04/17  17:37    


 


Lymph % (Auto)  13.3 % (15.0-45.0)  L  06/04/17  17:37    


 


Mono % (Auto)  2.0 % (4.5-13.0)  L  06/04/17  17:37    


 


Eos % (Auto)  0.4 % (0.6-7.6)  L  06/04/17  17:37    


 


Baso % (Auto)  0.2 % (0.3-1.7)  L  06/04/17  17:37    


 


Nucleat RBC Rel Count  0.0 % (0.0-0.2)   06/04/17  17:37    


 


Absolute Neuts (auto)  13.42 10^3/uL (1.70-6.50)  H  06/04/17  17:37    


 


Absolute Lymphs (auto)  2.13 10^3/uL (1.00-3.00)   06/04/17  17:37    


 


Absolute Monos (auto)  0.32 10^3/uL (0.30-0.80)   06/04/17  17:37    


 


Absolute Eos (auto)  0.06 10^3/uL (0.03-0.40)   06/04/17  17:37    


 


Absolute Basos (auto)  0.04 10^3/uL (0.02-0.10)   06/04/17  17:37    


 


Absolute Nucleated RBC  0.00 10^3/uL (0-0.01)   06/04/17  17:37    


 


Immature Gran %  0.4 % (0.0-1.1)   06/04/17  17:37    


 


Immature Gran #  0.06 10^3/uL (0.00-0.10)   06/04/17  17:37    


 


Sodium  139 mEq/L (134-144)   06/04/17  17:37    


 


Potassium  3.4 mEq/L (3.5-5.2)  L  06/04/17  17:37    


 


Chloride  108 mEq/L ()   06/04/17  17:37    


 


Carbon Dioxide  21 mEq/l (22-31)  L  06/04/17  17:37    


 


Anion Gap  10 mEq/L (8-16)   06/04/17  17:37    


 


BUN  14 mg/dL (7-23)   06/04/17  17:37    


 


Creatinine  0.6 mg/dL (0.6-1.0)   06/04/17  17:37    


 


Estimated GFR  > 60   06/04/17  17:37    


 


Glucose  98 mg/dL ()   06/04/17  17:37    


 


Calcium  8.9 mg/dL (8.5-10.4)   06/04/17  17:37    








Visualized and Interpreted Chest x-ray results: Yes


Chest X-Ray results: infiltrate





Assessment & Plan


Assessment: 








Acute hypoxemic respiratory failure secondary to acute exacerbation of COPD in 

the setting of chronic b/l pulmonary infiltrates.  It's unclear if she truly 

has another bacterial infection.  She is afebrile here, though WBC's are 

elevated.  Differential is broad for her chronic infiltrates including 

eosinophilic PNA, , sarcoidosis, drug reaction, pneumonitis. Case discussed 

with ID, who will consult tomorrow.


   -BCx's and sputum cultures sent


   -she received Ceftriaxone in ED. Will add Azithromycin.  


   -repeat non-con chest CT


   -Plan for q4h duonebs, supplemental O2 for COPD component


   -recommend pulmonary consult in am for possible bronch


   -defer steroids for now.  pt agreeable to bronchoscopy and starting steroids 

now could skew results if she has an eosinophilic process


   -send respiratory viral panel


   -urine drug screen





Abdominal pain - pt gives h/o hernia.  Pain out of proportion to exam.


   -check CT abd





Anxiety / PTSD - cont prn vistaril, one time dose of ativan to pre-treat prior 

to CT





Homelessness - lives at [facility].  


   -CM consult





DVT PPLX - Lovenox





Full code





Dispo - inpt, will likely require >48 hrs hospitalization for ongoing workup 

and management of her hypoxemia and b/l pulmonary infiltrates

## 2017-06-04 NOTE — EDPHY
HPI/HX/ROS/PE/MDM


Narrative: 


CHIEF COMPLAINT: Dyspnea





HPI: This patient is a 47-year-old female with history of COPD and recurrent 

bronchitis who presents to the Emergency Department via EMS complaining of 

worsening shortness of breath and cough over the past few days. She also 

complains of nausea and vomiting beginning last night, generalized malaise, and 

fever measured at home up to 102F. Upon arrival, she reports that her dyspnea 

has moderately improved but continues to complain of cough and malaise. Nausea 

was alleviated with 12.5mg Phenergan administered by EMS in transport. She uses 

an albuterol inhaler at home for bronchitis recurrences but reports that this 

did not improve her cough and dyspnea today. Medical history also includes 

anxiety.





REVIEW OF SYSTEMS:


Aside from elements discussed in the HPI, a comprehensive 10-point review of 

systems was reviewed and is negative.





PMH:


1. Recurrent bronchitis (albuterol inhaler)


2. Self-reducing hernia


3. Anxiety


4. COPD


5. TBI 2002





SOCIAL HISTORY: Smokes regularly, single.





PHYSICAL EXAM:


General:Patient is alert, in no acute distress.


ENT:Eyes are normal to inspection.  ENT inspection normal.


Neck: Normal inspection.  Full range of motion.


Respiratory:No respiratory distress.  Breath sounds normal bilaterally.


Cardiovascular: Regular rate and rhythm.  Strong peripheral pulses.  Normal cap 

refill.


Abdomen:The abdomen is nontender to palpation. There are no peritoneal signs. 

There are normal bowel sounds.


Back: Normal to inspection.  No tenderness to palpation.


Skin: Normal color.  No rash.  Warm and dry.


Extremities: Normal appearance.  Full range of motion.


Neuro: Oriented x3.  Normal motor function.  Normal sensory function.





ED Course: 


1618: Took EMS report at bedside: CC of dyspnea. O2 sat reported in mid-80% 

range upon fire arrival, a nonrebreather was used. 94% on RA by EMS arrival. 

12.5mg Phenergan in transport for nausea.





47-year-old female with history of COPD, bronchitis, and hypoxemic respiratory 

failure in 3/2017 who presents with complaints of dyspnea, cough, self-reported 

fever, nausea, and malaise. On exam, her lungs are clear to auscultation. She 

is not hypoxemic in the ED. Will proceed with labs and chest x-ray.





Chest x-ray read by Dr. Oppenheimer, radiologist, and reviewed by myself shows 

chronic bilateral infiltrate.





Labs obtained: WBC elevated at 16.03.





2020: The patient is hypoxemic down to 82% on RA when ambulating. Given this, I 

feel that the patient should be admitted. I discussed this with her and she 

expresses agreement to this treatment plan.





2033: Consultation with Dr. Kelly Cushing, hospitalist, who accepts admission.





- Data Points


Imaging Results: 


 Imaging Impressions





Chest X-Ray  06/04/17 16:23


Impression: Chronic bilateral pulmonary infiltrate remains, slightly worse in 

the right lower lobe.


 











Imaging: I viewed and interpreted images myself


Laboratory Results: 


 Laboratory Results





 06/04/17 17:37 





 06/04/17 17:37 





 











  06/04/17 06/04/17





  17:37 17:37


 


WBC    16.03 10^3/uL H 10^3/uL





    (3.80-9.50) 


 


RBC    4.26 10^6/uL 10^6/uL





    (4.18-5.33) 


 


Hgb    12.1 g/dL L g/dL





    (12.6-16.3) 


 


Hct    37.0 % L %





    (38.0-47.0) 


 


MCV    86.9 fL fL





    (81.5-99.8) 


 


MCH    28.4 pg pg





    (27.9-34.1) 


 


MCHC    32.7 g/dL g/dL





    (32.4-36.7) 


 


RDW    15.5 % H %





    (11.5-15.2) 


 


Plt Count    202 10^3/uL 10^3/uL





    (150-400) 


 


MPV    10.2 fL fL





    (8.7-11.7) 


 


Neut % (Auto)    83.7 % H %





    (39.3-74.2) 


 


Lymph % (Auto)    13.3 % L %





    (15.0-45.0) 


 


Mono % (Auto)    2.0 % L %





    (4.5-13.0) 


 


Eos % (Auto)    0.4 % L %





    (0.6-7.6) 


 


Baso % (Auto)    0.2 % L %





    (0.3-1.7) 


 


Nucleat RBC Rel Count    0.0 % %





    (0.0-0.2) 


 


Absolute Neuts (auto)    13.42 10^3/uL H 10^3/uL





    (1.70-6.50) 


 


Absolute Lymphs (auto)    2.13 10^3/uL 10^3/uL





    (1.00-3.00) 


 


Absolute Monos (auto)    0.32 10^3/uL 10^3/uL





    (0.30-0.80) 


 


Absolute Eos (auto)    0.06 10^3/uL 10^3/uL





    (0.03-0.40) 


 


Absolute Basos (auto)    0.04 10^3/uL 10^3/uL





    (0.02-0.10) 


 


Absolute Nucleated RBC    0.00 10^3/uL 10^3/uL





    (0-0.01) 


 


Immature Gran %    0.4 % %





    (0.0-1.1) 


 


Immature Gran #    0.06 10^3/uL 10^3/uL





    (0.00-0.10) 


 


Sodium  139 mEq/L mEq/L  





   (134-144)  


 


Potassium  3.4 mEq/L L mEq/L  





   (3.5-5.2)  


 


Chloride  108 mEq/L mEq/L  





   ()  


 


Carbon Dioxide  21 mEq/l L mEq/l  





   (22-31)  


 


Anion Gap  10 mEq/L mEq/L  





   (8-16)  


 


BUN  14 mg/dL mg/dL  





   (7-23)  


 


Creatinine  0.6 mg/dL mg/dL  





   (0.6-1.0)  


 


Estimated GFR  > 60   





   


 


Glucose  98 mg/dL mg/dL  





   ()  


 


Calcium  8.9 mg/dL mg/dL  





   (8.5-10.4)  











Medications Given: 


 








Discontinued Medications





Ceftriaxone Sodium/Dextrose (Rocephin 1 Gm (Premix))  50 mls @ 100 mls/hr IV 

EDNOW ONE


   PRN Reason: Protocol


   Stop: 06/04/17 21:02


   Last Admin: 06/04/17 20:54 Dose:  50 mls








General


Initial Vital Signs: 


 Initial Vital Signs











Temperature (C)  36.7 C   06/04/17 16:28


 


Heart Rate  93   06/04/17 16:28


 


Respiratory Rate  20   06/04/17 16:28


 


Blood Pressure  117/96 H  06/04/17 16:28


 


O2 Sat (%)  93   06/04/17 16:28








 











O2 Delivery Mode               Room Air


 


O2 (L/minute)                  2














Allergies/Adverse Reactions: 


 





droperidol [From Inapsine] Allergy (Severe, Verified 02/10/17 20:04)


 SEIZURES


haloperidol [From Haldol] Allergy (Severe, Verified 02/10/17 20:04)


 SEIZURES


haloperidol lactate [From Haldol] Allergy (Severe, Verified 02/10/17 20:04)


 SEIZURES








Home Medications: 














 Medication  Instructions  Recorded


 


Albuterol [Proventil Inhaler HFA 1 - 2 puffs IH Q4H 06/04/17





(*)]  


 


Ascorbic Acid [Vitamin C 500 mg 500 mg PO BID 06/04/17





(*)]  


 


Cholecalciferol Vit D3 [Vitamin D3 1,000 units PO DAILY 06/04/17





(*)]  


 


Herbals/Supplements -Info Only 1 ea PO DAILY 06/04/17


 


Methocarbamol 500 mg PO TID 06/04/17


 


hydrOXYzine HCL [Vistaril] 50 mg PO QID PRN 06/04/17


 


levOFLOXACIN [levAQUIN (*)] 750 mg PO DAILY 06/04/17


 


traMADol [Ultram 50 mg (*)] 50 mg PO Q6 PRN 06/04/17














Departure





- Departure


Disposition: [facility] Inpatient Acute


Clinical Impression: 


 Hypoxia





Condition: Fair


Report Scribed for: Ochoa Tobin


Report Scribed by: Padmini Holloway


Date of Report: 06/04/17


Time of Report: 16:18


Physician Review and Approval Statement: Portions of this note were transcribed 

by an ED scribe.  I personally performed the history, physical exam, and 

medical decision making; and confirm the accuracy of the information in the 

transcribed note.

## 2017-06-05 LAB
% IMMATURE GRANULYOCYTES: 0.3 % (ref 0–1.1)
ABSOLUTE IMMATURE GRANULOCYTES: 0.04 10^3/UL (ref 0–0.1)
ABSOLUTE NRBC COUNT: 0 10^3/UL (ref 0–0.01)
ADD DIFF?: NO
ADD MORPH?: NO
ADD SCAN?: NO
ANION GAP SERPL CALC-SCNC: 9 MEQ/L (ref 8–16)
ATYPICAL LYMPHOCYTE FLAG: 0 (ref 0–99)
CALCIUM SERPL-MCNC: 8.8 MG/DL (ref 8.5–10.4)
CHLORIDE SERPL-SCNC: 109 MEQ/L (ref 97–110)
CO2 SERPL-SCNC: 21 MEQ/L (ref 22–31)
CREAT SERPL-MCNC: 0.6 MG/DL (ref 0.6–1)
ERYTHROCYTE [DISTWIDTH] IN BLOOD BY AUTOMATED COUNT: 15.6 % (ref 11.5–15.2)
FRAGMENT RBC FLAG: 0 (ref 0–99)
GFR SERPL CREATININE-BSD FRML MDRD: > 60 ML/MIN/{1.73_M2}
GLUCOSE SERPL-MCNC: 96 MG/DL (ref 70–100)
HCT VFR BLD CALC: 36.4 % (ref 38–47)
HGB BLD-MCNC: 12.1 G/DL (ref 12.6–16.3)
LEFT SHIFT FLG: 10 (ref 0–99)
LIPEMIA HEMOLYSIS FLAG: 80 (ref 0–99)
MCH RBC BLDCO QN: 28.7 PG (ref 27.9–34.1)
MCHC RBC AUTO-ENTMCNC: 33.2 G/DL (ref 32.4–36.7)
MCV RBC AUTO: 86.3 FL (ref 81.5–99.8)
NRBC-AUTO%: 0 % (ref 0–0.2)
PHENCYCLIDINE URINE BCH: NEGATIVE NG/ML
PLATELET # BLD: 354 10^3/UL (ref 150–400)
PLATELET CLUMPS FLAG: 0 (ref 0–99)
PMV BLD AUTO: 10 FL (ref 8.7–11.7)
POTASSIUM SERPL-SCNC: 4 MEQ/L (ref 3.5–5.2)
RBC # BLD AUTO: 4.22 10^6/UL (ref 4.18–5.33)
SODIUM SERPL-SCNC: 139 MEQ/L (ref 134–144)
TETRAHYDROCANNABINOL URINE: < 5 NG/ML

## 2017-06-05 RX ADMIN — METHOCARBAMOL SCH MG: 500 TABLET ORAL at 20:16

## 2017-06-05 RX ADMIN — METHOCARBAMOL SCH: 500 TABLET ORAL at 20:17

## 2017-06-05 RX ADMIN — IPRATROPIUM BROMIDE AND ALBUTEROL SULFATE SCH ML: .5; 3 SOLUTION RESPIRATORY (INHALATION) at 13:53

## 2017-06-05 RX ADMIN — IPRATROPIUM BROMIDE AND ALBUTEROL SULFATE SCH ML: .5; 3 SOLUTION RESPIRATORY (INHALATION) at 01:50

## 2017-06-05 RX ADMIN — IPRATROPIUM BROMIDE AND ALBUTEROL SULFATE SCH: .5; 3 SOLUTION RESPIRATORY (INHALATION) at 01:50

## 2017-06-05 RX ADMIN — METHOCARBAMOL SCH: 500 TABLET ORAL at 20:18

## 2017-06-05 RX ADMIN — IPRATROPIUM BROMIDE AND ALBUTEROL SULFATE SCH ML: .5; 3 SOLUTION RESPIRATORY (INHALATION) at 17:13

## 2017-06-05 RX ADMIN — PANTOPRAZOLE SODIUM SCH MG: 40 TABLET, DELAYED RELEASE ORAL at 20:17

## 2017-06-05 RX ADMIN — AZITHROMYCIN SCH: 250 TABLET, FILM COATED ORAL at 20:19

## 2017-06-05 RX ADMIN — HYDROXYZINE HYDROCHLORIDE PRN MG: 50 TABLET ORAL at 13:55

## 2017-06-05 RX ADMIN — IPRATROPIUM BROMIDE AND ALBUTEROL SULFATE SCH ML: .5; 3 SOLUTION RESPIRATORY (INHALATION) at 11:13

## 2017-06-05 RX ADMIN — ONDANSETRON PRN MG: 4 TABLET, ORALLY DISINTEGRATING ORAL at 12:59

## 2017-06-05 RX ADMIN — ENOXAPARIN SODIUM SCH: 100 INJECTION SUBCUTANEOUS at 20:17

## 2017-06-05 RX ADMIN — IPRATROPIUM BROMIDE AND ALBUTEROL SULFATE SCH ML: .5; 3 SOLUTION RESPIRATORY (INHALATION) at 21:42

## 2017-06-05 RX ADMIN — PANTOPRAZOLE SODIUM SCH MG: 40 TABLET, DELAYED RELEASE ORAL at 15:30

## 2017-06-05 RX ADMIN — ACETAMINOPHEN PRN MG: 325 TABLET ORAL at 13:55

## 2017-06-05 RX ADMIN — IPRATROPIUM BROMIDE AND ALBUTEROL SULFATE SCH: .5; 3 SOLUTION RESPIRATORY (INHALATION) at 14:11

## 2017-06-05 NOTE — CPEKG
Heart Rate: 73

RR Interval: 822

P-R Interval: 196

QRSD Interval: 102

QT Interval: 400

QTC Interval: 441

P Axis: 16

QRS Axis: -65

T Wave Axis: 36

EKG Severity - ABNORMAL ECG -

EKG Impression: SINUS RHYTHM

EKG Impression: CONSIDER INFERIOR INFARCT, AGE INDETERMINATE

EKG Impression: BORDERLINE R WAVE PROGRESSION, ANTERIOR LEADS

Electronically Signed By: Vignesh Kurtz 06-Jun-2017 07:51:32

## 2017-06-05 NOTE — HOSPPROG
Hospitalist Progress Note


Assessment/Plan: 





DIAGNOSES: 


-Acute hypoxemic respiratory failure


-COPD exacerbations


-History of chronic pulmonary infiltrates


-Nausea, uncertain etiology





PLANS:


-Add antiemetic


- continue current treatment for her respiratory symptoms


-Await Infectious Disease evaluation and opinion








SUBJECTIVE:


 complains of significant nausea any time she stands up or walks, which causes 

her to cough and makes her feel more short of breath


No chest pain abdominal pain


No symptoms of fever





OBJECTIVE


Vitals reviewed: stable without fever 





Exam:


alert oriented 


skin warm dry color ok


resps  mildly labored at rest on oxygen


lungs  very diminished BSs with some end-expiratory wheeze


heart regular


abd soft nondistended nontender, bowel sounds present


limbs warm, no edema


iv site ok





 respiratory viral panel negative


Cultures all pending with no growth so far











Objective: 


 Vital Signs











Temp Pulse Resp BP Pulse Ox


 


 37.1 C   79   20   112/74   85 L


 


 06/05/17 12:00  06/05/17 12:00  06/05/17 12:00  06/05/17 12:00  06/05/17 12:02








 Microbiology











 06/05/17 01:24  - Final





 Sputum, Expectorated 


 


 06/05/17 01:35 Respiratory Panel (PCR) - Final





 Nasal, Sinus - Swab    No Organism Detected








 Laboratory Results





 06/05/17 01:42 





 06/05/17 01:42 





 











 06/04/17 06/05/17 06/06/17





 06:59 06:59 06:59


 


Intake Total  500 


 


Balance  500 














ICD10 Worksheet


Patient Problems: 


 Problems











Problem Status Onset


 


Hypoxia Acute  


 


Pneumonia Acute  


 


Sepsis Acute

## 2017-06-05 NOTE — PCMIDPN
Assessment/Plan: 


Assessment:


Bilateral multifocal infiltrates.  Patient had a similar appearing chest CT 

scan a few months ago when she presented with clear influenza B. Those 

infiltrates could have been correctly associated with viral pneumonia however I 

would have suspected that those would be resolved at this point.  Given that 

they are not this is suspicious for an underlying chronic condition of the 

lungs that was acutely worsened by influenza B in April.  Agree with 

bronchoscopy and biopsy.  Patient is on azithromycin and she has a negative 

respiratory panel PCR.  This panel includes both mycoplasma and Chlamydia so it 

is unclear what we are covering with the azithromycin at this point.  Would 

discontinue and observe.











Plan:


1. Agree with bronchoscopy today.


2. Would discontinue azithromycin and observe.


3. Follow up on pathology results.


4. Follow clinical course.








06/05/17 16:45





Subjective: 





Patient is resting in her hospital bed.  She states that after her discharge a 

couple of months ago on Tamiflu she felt better for a number of weeks but has 

never retained normal breathing status.  She attributes that to the weather 

here in [place].


Objective: 


azithromycin #3 Vital Signs











Temp Pulse Resp BP Pulse Ox


 


 36.9 C   75   20   89/52 L  97 


 


 06/05/17 04:00  06/05/17 04:00  06/05/17 04:00  06/05/17 04:00  06/05/17 04:00








 Microbiology











 06/05/17 01:24  - Final





 Sputum, Expectorated 


 


 06/05/17 01:35 Respiratory Panel (PCR) - Final





 Nasal, Sinus - Swab    No Organism Detected








 Laboratory Results





 06/05/17 01:42 





 06/05/17 01:42 





 











 06/04/17 06/05/17 06/06/17





 05:59 05:59 05:59


 


Intake Total  500 


 


Balance  500 














- Physical Exam


General Appearance: WD/WN, alert, no apparent distress, non-toxic


Respiratory: respiratory distress, crackles, No lungs clear


Cardiac/Chest: regular rate, rhythm, No tachycardia


Skin: normal color, warm/dry, No rash


Neuro/Psych: alert, normal mood/affect, oriented x 3





ICD10 Worksheet


Patient Problems: 


 Problems











Problem Status Onset


 


Hypoxia Acute  


 


Pneumonia Acute  


 


Sepsis Acute

## 2017-06-06 RX ADMIN — IPRATROPIUM BROMIDE AND ALBUTEROL SULFATE SCH ML: .5; 3 SOLUTION RESPIRATORY (INHALATION) at 21:59

## 2017-06-06 RX ADMIN — ONDANSETRON PRN MG: 4 TABLET, ORALLY DISINTEGRATING ORAL at 10:55

## 2017-06-06 RX ADMIN — IPRATROPIUM BROMIDE AND ALBUTEROL SULFATE SCH ML: .5; 3 SOLUTION RESPIRATORY (INHALATION) at 17:53

## 2017-06-06 RX ADMIN — HYDROXYZINE HYDROCHLORIDE PRN MG: 50 TABLET ORAL at 14:44

## 2017-06-06 RX ADMIN — IPRATROPIUM BROMIDE AND ALBUTEROL SULFATE SCH ML: .5; 3 SOLUTION RESPIRATORY (INHALATION) at 10:02

## 2017-06-06 RX ADMIN — METHOCARBAMOL SCH MG: 500 TABLET ORAL at 21:12

## 2017-06-06 RX ADMIN — IPRATROPIUM BROMIDE AND ALBUTEROL SULFATE SCH ML: .5; 3 SOLUTION RESPIRATORY (INHALATION) at 14:08

## 2017-06-06 RX ADMIN — IPRATROPIUM BROMIDE AND ALBUTEROL SULFATE SCH ML: .5; 3 SOLUTION RESPIRATORY (INHALATION) at 05:47

## 2017-06-06 RX ADMIN — ACETAMINOPHEN PRN MG: 325 TABLET ORAL at 14:44

## 2017-06-06 RX ADMIN — IPRATROPIUM BROMIDE AND ALBUTEROL SULFATE SCH ML: .5; 3 SOLUTION RESPIRATORY (INHALATION) at 01:29

## 2017-06-06 RX ADMIN — METHOCARBAMOL SCH MG: 500 TABLET ORAL at 10:34

## 2017-06-06 RX ADMIN — METHOCARBAMOL SCH MG: 500 TABLET ORAL at 16:51

## 2017-06-06 RX ADMIN — PANTOPRAZOLE SODIUM SCH MG: 40 TABLET, DELAYED RELEASE ORAL at 10:34

## 2017-06-06 RX ADMIN — HYDROXYZINE HYDROCHLORIDE PRN MG: 50 TABLET ORAL at 10:55

## 2017-06-06 RX ADMIN — ENOXAPARIN SODIUM SCH MG: 100 INJECTION SUBCUTANEOUS at 10:35

## 2017-06-06 RX ADMIN — PANTOPRAZOLE SODIUM SCH MG: 40 TABLET, DELAYED RELEASE ORAL at 21:12

## 2017-06-06 NOTE — GCON
[f rep st]



                                                                    CONSULTATION





PULMONARY CONSULTATION



DATE OF CONSULTATION:  2017





REASON FOR CONSULTATION:  Pulmonary infiltrates.



HISTORY:  The patient is a 47-year-old with an underlying history of COPD, smoking, and homelessness
, who was admitted from the emergency department 2 days ago with fever and shortness of breath.  CT 
scan of the chest showed bilateral patchy pulmonary infiltrates, waxing and waning in all lung field
s compared to a CT scan done on a previous admission with similar symptoms and findings approximatel
y 3 months ago.  She was seen by Dr. Ibanez from our service at that time and refused bronchoscopy.  
She was positive for influenza B on that admission and was treated with Tamiflu as well as Levaquin.
  She has no history of inflammatory lung disease or collagen vascular disease.  Workup has been neg
ative regarding MG.  HIV testing has been negative along with serology for fungal organisms.  She i
s being seen by Infectious Disease. 



She does live at the shelter.  She states that other individuals in the shelter have been sick.  She
 also states that there may be some mold in the shelter.  She does not work with birds.  She is smok
ing relatively minimally at this time but does continue to smoke.  She does not do other drugs, othe
r inhalants, vapors, etc.  She has no exposures to birds, does not work with plants, does not use a 
hot tub, etc.  She does complain of some shortness of breath.  She is not bringing up any sputum cur
rently.  She denies any chest pain.  She does carry the diagnosis of COPD.  She is on albuterol as a
n outpatient.  She was started on Levaquin prior to coming in.  In the emergency department, she was
 in no respiratory distress and breath sounds were reported as normal.  She did have some nausea and
 vomiting prior to her admission and reported fevers to 102 at home.  She was afebrile in the emerge
ncy department.  White blood cell count was 16,000.



PAST MEDICAL HISTORY:  Remarkable for COPD, a hiatal hernia with some gastroesophageal reflux and po
ssibly some intermittent swallowing dysfunction.  There is a history of TBI in , memory deficits
 and anxiety.



SOCIAL HISTORY:  The patient is from [place].  She has been here since February and is living in [facility], waiting on an apartment.  She smokes cigarettes, less than a half a pack per day.  She d
enies alcohol or drug use.  She has no family in this area.  Her parents are .  Apparently t
here are children in [place]?



FAMILY HISTORY:  Dementia and emphysema.



REVIEW OF SYSTEMS:  A 10-point review of systems is negative.  She denies heart disease, thromboembo
lic disease, kidney disease, exposure to individuals with known tuberculosis, etc.



PHYSICAL EXAMINATION:  GENERAL:  Physical examination reveals a pleasant woman who is in no apparent
 distress.  She is sitting in a chair and appears comfortable.  Oxygen is in place at 1 L.  VITAL SI
GNS:  She is afebrile and has been afebrile since admission.  Respiratory rate is 18.  Blood pressur
e is approximately 100/70, heart rate 85 and regular.  HEENT:  Unremarkable for lymphadenopathy or t
hyromegaly.  There is no jugular venous distention.  Mucous membranes are moist.  There is no obviou
s pharyngitis.  CHEST:  Reveals somewhat diminished breath sounds bilaterally with some vague rales 
bilaterally at the lateral bases.  There are no rhonchi, few wheezes.  Expiratory phase is mildly pr
olonged.  HEART:  Regular in rate and rhythm.  Heart tones are somewhat distant.  There is a soft sy
stolic murmur, no gallop.  P2 appears normal.  ABDOMEN:  Soft, nontender.  Bowel sounds are present.
  EXTREMITIES:  Without significant edema, cords or tenderness.  NEUROLOGIC:  Nonfocal.  She is orie
nted.



DATABASE:  CT scan is as outlined above with multifocal patchy alveolar/ground-glass infiltrates, in
 somewhat different locations compared to her CT scan approximately 3 months ago.  White blood cell 
count currently is 13,000, hematocrit 36, platelets are normal.  Chemistries are within normal limit
s, troponin negative.  A number of serologies are pending.  Respiratory viral panel is negative.  Bl
ood cultures are pending.  A sputum culture appears to be mostly oral contaminated.



ASSESSMENT:  Bilateral pulmonary infiltrates.  These are atypical.  They may be associated with an a
typical pneumonia.  However, other diagnosis such as bronchiolitis obliterans with organizing pneumo
tc/cryptogenic organizing pneumonia, hypersensitivity pneumonitis, eosinophilic pneumonia, or other
 inflammatory lung disorders is possible.  Aspiration is also possible with recurrent areas of patch
y pneumonitis.  The patient was not agreeable to bronchoscopy previously but seems to be agreeable t
o this now.  She is on no antibiotics at this time and no steroids.  She was on Levaquin prior to he
r admission.



PLAN AND RECOMMENDATIONS:  Continued observation.  Withholding antibiotics and steroids is recommend
ed.  A barium swallow will be ordered looking for aspiration as well as significant reflux.  Enoxapa
rin will be held.  She will be made n.p.o. tomorrow morning and bronchoscopy will be arranged, with 
biopsies, for the afternoon. 



Further plans and recommendations will be made based on her progress and the findings of pending deondre
dies.





Job #:  [id number]

## 2017-06-06 NOTE — HOSPPROG
Hospitalist Progress Note


Assessment/Plan: 





DIAGNOSES: 


-Acute hypoxemic respiratory failure


-COPD exacerbations


-History of chronic pulmonary infiltrates


-Nausea, uncertain etiology





PLANS:


-Continue antiemetic


-continue current treatment for her respiratory symptoms


-the patient was seen by Dr. Calos Rudolph at my request today.  He has ordered 

bronchoscopy for tomorrow morning


-Barium swallow study to further assess her epigastric and nausea symptoms








SUBJECTIVE:


Continued symptoms of dyspnea, cough, nausea and epigastric fullness and 

discomfort are present.  No change in any of symptoms and no new symptoms





OBJECTIVE


Vitals reviewed: stable without fever 





Exam:


alert oriented 


skin warm dry color ok


resps  mildly labored at rest on oxygen


lungs  very diminished BSs with some end-expiratory wheeze


heart regular


abd soft nondistended nontender, bowel sounds present


limbs warm, no edema


iv site ok








Objective: 


 Vital Signs











Temp Pulse Resp BP Pulse Ox


 


 36.9 C   101 H  18   93/67 L  94 


 


 06/06/17 15:20  06/06/17 15:20  06/06/17 15:20  06/06/17 15:20  06/06/17 15:20








 Microbiology











 06/05/17 01:24  - Final





 Sputum, Expectorated 








 Laboratory Results





 06/05/17 01:42 





 06/05/17 01:42 





 











 06/05/17 06/06/17 06/07/17





 06:59 06:59 06:59


 


Intake Total 900 840 


 


Output Total  700 


 


Balance 900 140 














ICD10 Worksheet


Patient Problems: 


 Problems











Problem Status Onset


 


Hypoxia Acute  


 


Pneumonia Acute  


 


Sepsis Acute

## 2017-06-06 NOTE — PCMIDPN
Assessment/Plan: 


Assessment/Plan:





1. Bilateral Multifocal infiltrates:


- Uncertain in etiology.


- Recent influenza B three months ago, with worsening infiltrates. Did also 

receive 7 days of levaquin at that time as well. 


-For bronchoscopy today per previous notes.


-Coccidio Ab pending. PReviously had histo, legionella, strep, HIV, MG studies 

which are negative.


- admits to gerd and occasional aspiration of food.


- will check ANCA, ACE, crypto to work up. 





MEds


off antibiotics.











Subjective: 





Afebrile. Still with coughing and phelgm. denies coughing up blood. stays at a 

shelter. Also c/o epigastric discomfort, acid reflux and nausea. She admits to 

some anxiety this morning related to nausea and epigastric discomfort. denies 

diarrhea.


Objective: 


 Vital Signs











Temp Pulse Resp BP Pulse Ox


 


 36.9 C   82   17   90/52 L  93 


 


 06/06/17 07:17  06/06/17 10:02  06/06/17 10:02  06/06/17 07:17  06/06/17 10:02








 Microbiology











 06/05/17 01:24  - Final





 Sputum, Expectorated 


 


 06/05/17 01:35 Respiratory Panel (PCR) - Final





 Nasal, Sinus - Swab    No Organism Detected








 Laboratory Results





 06/05/17 01:42 





 06/05/17 01:42 





 











 06/05/17 06/06/17 06/07/17





 05:59 05:59 05:59


 


Intake Total 500 1240 


 


Output Total  700 


 


Balance 500 540 














- Physical Exam


General Appearance: alert, other (coughing)


Respiratory: coarse breath sounds


Cardiac/Chest: regular rate, rhythm


Extremities: No swelling


Abdomen: normal bowel sounds, soft, tender (mild in epigastric region), No 

distended


Skin: No erythema





ICD10 Worksheet


Patient Problems: 


 Problems











Problem Status Onset


 


Hypoxia Acute  


 


Pneumonia Acute  


 


Sepsis Acute

## 2017-06-07 RX ADMIN — PANTOPRAZOLE SODIUM SCH MG: 40 TABLET, DELAYED RELEASE ORAL at 19:41

## 2017-06-07 RX ADMIN — HYDROXYZINE HYDROCHLORIDE PRN MG: 50 TABLET ORAL at 18:33

## 2017-06-07 RX ADMIN — PANTOPRAZOLE SODIUM SCH MG: 40 TABLET, DELAYED RELEASE ORAL at 07:58

## 2017-06-07 RX ADMIN — OXYCODONE HYDROCHLORIDE PRN MG: 15 TABLET ORAL at 21:27

## 2017-06-07 RX ADMIN — IPRATROPIUM BROMIDE AND ALBUTEROL SULFATE SCH: .5; 3 SOLUTION RESPIRATORY (INHALATION) at 17:48

## 2017-06-07 RX ADMIN — IPRATROPIUM BROMIDE AND ALBUTEROL SULFATE SCH: .5; 3 SOLUTION RESPIRATORY (INHALATION) at 14:59

## 2017-06-07 RX ADMIN — METHOCARBAMOL SCH MG: 500 TABLET ORAL at 07:58

## 2017-06-07 RX ADMIN — IPRATROPIUM BROMIDE AND ALBUTEROL SULFATE SCH ML: .5; 3 SOLUTION RESPIRATORY (INHALATION) at 09:40

## 2017-06-07 RX ADMIN — HYDROXYZINE HYDROCHLORIDE PRN MG: 50 TABLET ORAL at 08:09

## 2017-06-07 RX ADMIN — OXYCODONE HYDROCHLORIDE AND ACETAMINOPHEN PRN TAB: 5; 325 TABLET ORAL at 19:41

## 2017-06-07 RX ADMIN — METHOCARBAMOL SCH MG: 500 TABLET ORAL at 18:33

## 2017-06-07 RX ADMIN — IPRATROPIUM BROMIDE AND ALBUTEROL SULFATE SCH: .5; 3 SOLUTION RESPIRATORY (INHALATION) at 01:56

## 2017-06-07 RX ADMIN — IPRATROPIUM BROMIDE AND ALBUTEROL SULFATE SCH ML: .5; 3 SOLUTION RESPIRATORY (INHALATION) at 21:35

## 2017-06-07 RX ADMIN — IPRATROPIUM BROMIDE AND ALBUTEROL SULFATE SCH ML: .5; 3 SOLUTION RESPIRATORY (INHALATION) at 05:13

## 2017-06-07 RX ADMIN — METHOCARBAMOL SCH: 500 TABLET ORAL at 22:06

## 2017-06-07 NOTE — GPN
[f rep st]



                                                                 PROCEDURE NOTE





PROCEDURE:  Bronchoscopy:  Attempted, unsuccessful.



INDICATION FOR PROCEDURE:  Diffuse bilateral waxing and waning pulmonary infiltrates.



DESCRIPTION OF PROCEDURE:  The procedure was done in the intensive care unit in a negative pressure 
room.  Informed consent was obtained from the patient.  Adequate time-out was performed.  1% lidocai
ne was used to anesthetize the posterior oropharynx.  After this, IV Versed and fentanyl were given.
  A total of 8 mg of Versed and 200 mcg of fentanyl were given over approximately a 15 minute period
 of time.  The patient initially appeared to get somewhat sleepy but would not tolerate the bronchos
cope in her oropharynx or lidocaine to the vocal cords or trachea without sitting up and trying to r
ip the tube out.  Further administration of Versed and fentanyl did not result in increased sedation
.  The patient did not want to continue with the procedure and further medications were not indicate
d. 



There was no evidence of IV infiltration but deep infiltration perhaps cannot be ruled out.  At leas
t 6 mg of Versed and 150 mcg of fentanyl were administered intravenously with good flow to the patie
nt's IV.  The last 2 mg of Versed and 50 mcg of fentanyl were more questionable as the IV did not ap
pear to be working at this time. 



No further attempts to sedate her or continue with the bronchoscopy were performed.  Vital signs and
 oxygen saturation remained normal throughout the attempted procedure. 



The patient is being returned to endoscopy to be recovered postoperatively for at least 1 hour befor
e sending her back upstairs. 



If a tissue diagnosis is required than general anesthesia will need to be performed for the bronchos
copy, or perhaps open lung biopsy?





Job #:  [id number]

## 2017-06-07 NOTE — HOSPPROG
Hospitalist Progress Note


Assessment/Plan: 





DIAGNOSES: 


-Acute hypoxemic respiratory failure


-COPD exacerbations


-History of chronic pulmonary infiltrates


-Nausea, uncertain etiology





PLANS:


-Continue antiemetic


-continue current treatment for her respiratory symptoms


- will await pending serology tests and cultures


-I will discuss further for Dr. Rudolph to determine any other plans at this time.








SUBJECTIVE:


I assessed the patient at bedside both before and after her attempted 

bronchoscopy.


Essentially no change in her respiratory or epigastric symptoms today


No other new symptoms





Bronchoscopy was attempted today.  Despite 8 mg of Versed and large doses of 

fentanyl, adequate sedation was not able to be achieved to proceed with the 

procedure which was aborted.  Have not spoken directly with Dr. Rudolph at this 

time but it sounds like we did not really even get a start and did not likely 

gain any new clinical information.


OBJECTIVE


Vitals reviewed: stable without fever 





Exam:


alert oriented 


skin warm dry color ok


resps  mildly labored at rest on oxygen


lungs  very diminished BSs with some end-expiratory wheeze


heart regular


abd soft nondistended nontender, bowel sounds present


limbs warm, no edema


iv site ok








Objective: 


 Vital Signs











Temp Pulse Resp BP Pulse Ox


 


 36.8 C   76   16   111/73   92 


 


 06/07/17 18:30  06/07/17 18:30  06/07/17 18:30  06/07/17 18:30  06/07/17 18:30








 Microbiology











 06/05/17 01:24  - Final





 Sputum, Expectorated Sputum Culture - Final








 Laboratory Results





 06/05/17 01:42 





 06/05/17 01:42 





 











 06/06/17 06/07/17 06/08/17





 06:59 06:59 06:59


 


Intake Total 840 900 25


 


Output Total 700 500 


 


Balance 140 400 25














ICD10 Worksheet


Patient Problems: 


 Problems











Problem Status Onset


 


Hypoxia Acute  


 


Pneumonia Acute  


 


Sepsis Acute

## 2017-06-08 LAB — ACE SERPL-CCNC: 28 U/L (ref 8–53)

## 2017-06-08 RX ADMIN — HYDROXYZINE HYDROCHLORIDE PRN MG: 50 TABLET ORAL at 01:07

## 2017-06-08 RX ADMIN — HYDROXYZINE HYDROCHLORIDE PRN MG: 50 TABLET ORAL at 07:47

## 2017-06-08 RX ADMIN — IPRATROPIUM BROMIDE AND ALBUTEROL SULFATE SCH ML: .5; 3 SOLUTION RESPIRATORY (INHALATION) at 22:16

## 2017-06-08 RX ADMIN — ACETAMINOPHEN PRN MG: 325 TABLET ORAL at 14:43

## 2017-06-08 RX ADMIN — IPRATROPIUM BROMIDE AND ALBUTEROL SULFATE SCH: .5; 3 SOLUTION RESPIRATORY (INHALATION) at 05:49

## 2017-06-08 RX ADMIN — HYDROXYZINE HYDROCHLORIDE PRN MG: 50 TABLET ORAL at 15:31

## 2017-06-08 RX ADMIN — OXYCODONE HYDROCHLORIDE PRN MG: 15 TABLET ORAL at 04:56

## 2017-06-08 RX ADMIN — IPRATROPIUM BROMIDE AND ALBUTEROL SULFATE SCH ML: .5; 3 SOLUTION RESPIRATORY (INHALATION) at 09:30

## 2017-06-08 RX ADMIN — PANTOPRAZOLE SODIUM SCH MG: 40 TABLET, DELAYED RELEASE ORAL at 07:47

## 2017-06-08 RX ADMIN — METHOCARBAMOL SCH MG: 500 TABLET ORAL at 01:05

## 2017-06-08 RX ADMIN — METHOCARBAMOL SCH MG: 500 TABLET ORAL at 14:44

## 2017-06-08 RX ADMIN — IPRATROPIUM BROMIDE AND ALBUTEROL SULFATE SCH ML: .5; 3 SOLUTION RESPIRATORY (INHALATION) at 16:15

## 2017-06-08 RX ADMIN — OXYCODONE HYDROCHLORIDE PRN MG: 15 TABLET ORAL at 01:07

## 2017-06-08 RX ADMIN — IPRATROPIUM BROMIDE AND ALBUTEROL SULFATE SCH: .5; 3 SOLUTION RESPIRATORY (INHALATION) at 02:16

## 2017-06-08 RX ADMIN — METHOCARBAMOL SCH MG: 500 TABLET ORAL at 07:47

## 2017-06-08 RX ADMIN — OXYCODONE HYDROCHLORIDE PRN MG: 15 TABLET ORAL at 09:45

## 2017-06-08 RX ADMIN — OXYCODONE HYDROCHLORIDE PRN MG: 15 TABLET ORAL at 19:13

## 2017-06-08 RX ADMIN — IPRATROPIUM BROMIDE AND ALBUTEROL SULFATE SCH: .5; 3 SOLUTION RESPIRATORY (INHALATION) at 14:15

## 2017-06-08 RX ADMIN — PANTOPRAZOLE SODIUM SCH MG: 40 TABLET, DELAYED RELEASE ORAL at 19:14

## 2017-06-08 RX ADMIN — OXYCODONE HYDROCHLORIDE AND ACETAMINOPHEN PRN TAB: 5; 325 TABLET ORAL at 02:14

## 2017-06-08 RX ADMIN — OXYCODONE HYDROCHLORIDE PRN MG: 15 TABLET ORAL at 14:44

## 2017-06-08 RX ADMIN — METHOCARBAMOL SCH MG: 500 TABLET ORAL at 20:37

## 2017-06-08 NOTE — HOSPPROG
Hospitalist Progress Note


Assessment/Plan: 





DIAGNOSES: 


-Acute hypoxemic respiratory failure


-COPD exacerbations


-History of chronic pulmonary infiltrates


-Acute Incarceration of periumbilical hernia





PLANS:


-I will repeat CXR now to look for any improvement


-I will review her case with Dr Ronni aguero after that


-She will need surgery for her hernia, but need to make sure that anesthesia 

will be safe from a resp/pulm perspective


-await serologies for evaluation of her lungs; ? further future attempt at broch

, ? with general anesthesia








SUBJECTIVE:


still w some dyspnea, very hard to clarify whether she feels less dyspneic over 

time or not


she still c/o epigastric pain and difficulty eating but today mentions the pain 

is worse with movement





OBJECTIVE


Vitals reviewed: stable without fever 





Exam:


alert oriented 


skin warm dry color ok


resps  mildly labored at rest on oxygen


lungs  very diminished BSs with some end-expiratory wheeze


heart regular


abd on exam today there is clearly an incarcerated hernia superior to umbilicus 

that is fairly tender but no discoloration, swelling or heat of the surrounding 

tissues


limbs warm, no edema


iv site ok








Objective: 


 Vital Signs











Temp Pulse Resp BP Pulse Ox


 


 36.9 C   75   18   94/62 L  96 


 


 06/08/17 07:30  06/08/17 09:30  06/08/17 09:30  06/08/17 07:30  06/08/17 09:30








 Microbiology











 06/05/17 01:24  - Final





 Sputum, Expectorated Sputum Culture - Final








 Laboratory Results





 06/05/17 01:42 





 06/05/17 01:42 





 











 06/07/17 06/08/17 06/09/17





 06:59 06:59 06:59


 


Intake Total 900 375 


 


Output Total 500  


 


Balance 400 375 














ICD10 Worksheet


Patient Problems: 


 Problems











Problem Status Onset


 


Hypoxia Acute  


 


Pneumonia Acute  


 


Sepsis Acute

## 2017-06-08 NOTE — SOAPPROG
SOAP Progress Note


Assessment/Plan: 


Assessment:





Bilateral pulmonary infiltrates.  Etiology unclear.  Query infectious 

pneumonitis, query inflammatory lung disease, , EP, etc.  Unable to perform 

bronchoscopy yesterday.  My feeling is that we should go ahead and treat her 

with antibiotics will cover atypicals such as Cipro or azithromycin and give 

her steroids.  We can follow her as an outpatient.  If the infiltrates come 

back then biopsy at that time, probably with general anesthesia.





COPD:  With exacerbation, consistent with bronchitis/bronchopneumonia/

pneumonitis.





Umbilical hernia:  May need surgery.  There are no pulmonary contraindications 

to this.  We could consider going after lung biopsy by bronchoscopy and 

biopsies during surgery?.


























Plan:  Continue present care for now.  Await chest x-ray results from today.  I 

imagine this will show persistent infiltrates.  Consider antibiotics and 

steroids but do not start at this time.  Will discuss with hospitalist and 

Infectious Disease. Discussed with patient.











Subjective: 





Doing okay.  Complains of back pain. Denies significant shortness of breath.  

She is coughing and bringing up some yellow phlegm.


Objective: 





 Vital Signs











Temp Pulse Resp BP Pulse Ox


 


 36.7 C   75   18   101/66   96 


 


 06/08/17 16:00  06/08/17 16:10  06/08/17 16:10  06/08/17 16:00  06/08/17 16:10








 Microbiology











 06/05/17 01:24  - Final





 Sputum, Expectorated Sputum Culture - Final








 Laboratory Results





 06/05/17 01:42 





 06/05/17 01:42 





 











 06/07/17 06/08/17 06/09/17





 05:59 05:59 05:59


 


Intake Total 900 375 


 


Output Total 500  


 


Balance 400 375 











 Laboratory Tests











  06/06/17





  10:00


 


Proteinase 3 (PR3)  < 0.2


 


Myeloperoxidase Ab  <0.2


 


Cryptococcus Ag Screen  Negative














Physical Exam





- Physical Exam


General Appearance: alert, no apparent distress


EENT: other (Nasal cannula 2 L)


Neck: normal inspection


Respiratory: decreased breath sounds, rales (Scattered rales posteriorly and 

anteriorly), rhonchi (Few with cough), No wheezing


Cardiac/Chest: regular rate, rhythm


Abdomen: normal bowel sounds, non-tender, soft


Skin: normal color, warm/dry


Extremities: No pedal edema


Neuro/Psych: no motor/sensory deficits, sensory deficit, No cognition 

abnormalities





ICD10 Worksheet


Patient Problems: 


 Problems











Problem Status Onset


 


Hypoxia Acute  


 


Pneumonia Acute  


 


Sepsis Acute

## 2017-06-08 NOTE — PCMIDPN
Assessment/Plan: 


Assessment:


Bilateral multifocal infiltrates.  Bronchoscopy failed secondary to patient 

intolerance.  They may have to do a bronchoscopy and biopsy under more complete 

anesthesia.  I do not think this looks infectious.  I suspect instead that this 

may be recurrent aspiration events perhaps increased in likelihood by hiatal 

hernia.











Plan:


1.  Observe off antibiotics.


2. Follow clinical course.








06/05/17 16:45





06/08/17 18:08





Subjective: 





Patient continues to have similar complaints.  Some shortness of breath and 

cough.  She did have a mucus plug that she coughed up after returning from 

attempted bronchoscopy


Objective: 


No antibiotics Vital Signs











Temp Pulse Resp BP Pulse Ox


 


 36.7 C   75   18   101/66   96 


 


 06/08/17 16:00  06/08/17 16:10  06/08/17 16:10  06/08/17 16:00  06/08/17 16:10








 Laboratory Results





 06/05/17 01:42 





 06/05/17 01:42 





 











 06/07/17 06/08/17 06/09/17





 05:59 05:59 05:59


 


Intake Total 900 375 


 


Output Total 500  


 


Balance 400 375 














- Physical Exam


General Appearance: WD/WN, alert, no apparent distress, non-toxic


Respiratory: No lungs clear, No normal breath sounds


Cardiac/Chest: regular rate, rhythm, No tachycardia


Skin: normal color, warm/dry, No rash





ICD10 Worksheet


Patient Problems: 


 Problems











Problem Status Onset


 


Hypoxia Acute  


 


Pneumonia Acute  


 


Sepsis Acute

## 2017-06-09 LAB
% IMMATURE GRANULYOCYTES: 0.4 % (ref 0–1.1)
ABSOLUTE IMMATURE GRANULOCYTES: 0.03 10^3/UL (ref 0–0.1)
ABSOLUTE NRBC COUNT: 0 10^3/UL (ref 0–0.01)
ADD DIFF?: NO
ADD MORPH?: NO
ADD SCAN?: NO
ALBUMIN SERPL-MCNC: 3.4 G/DL (ref 3.5–5)
ALP SERPL-CCNC: 194 IU/L (ref 38–126)
ALT SERPL-CCNC: 137 IU/L (ref 9–52)
ANION GAP SERPL CALC-SCNC: 8 MEQ/L (ref 8–16)
AST SERPL-CCNC: 282 IU/L (ref 14–46)
ATYPICAL LYMPHOCYTE FLAG: 10 (ref 0–99)
BASE EXCESS BLDA CALC-SCNC: 0 MEQ/L (ref -2.5–2.5)
BILIRUB SERPL-MCNC: 0.8 MG/DL (ref 0.1–1.4)
CALCIUM SERPL-MCNC: 8.2 MG/DL (ref 8.5–10.4)
CHLORIDE SERPL-SCNC: 105 MEQ/L (ref 97–110)
CMV DNA SERPL NAA+PROBE-ACNC: 99 % (ref 92–95)
CO2 SERPL-SCNC: 23 MEQ/L (ref 22–31)
CREAT SERPL-MCNC: 0.5 MG/DL (ref 0.6–1)
END TIDAL CO2: 33
ERYTHROCYTE [DISTWIDTH] IN BLOOD BY AUTOMATED COUNT: 15.2 % (ref 11.5–15.2)
FRAGMENT RBC FLAG: 0 (ref 0–99)
GFR SERPL CREATININE-BSD FRML MDRD: > 60 ML/MIN/{1.73_M2}
GLUCOSE SERPL-MCNC: 102 MG/DL (ref 70–100)
HCO3 BLD-SCNC: 23 MEQ/L (ref 22–26)
HCT VFR BLD CALC: 30.5 % (ref 38–47)
HGB BLD-MCNC: 9.9 G/DL (ref 12.6–16.3)
LEFT SHIFT FLG: 10 (ref 0–99)
LIPEMIA HEMOLYSIS FLAG: 80 (ref 0–99)
Lab: (no result)
MCH RBC BLDCO QN: 28.8 PG (ref 27.9–34.1)
MCHC RBC AUTO-ENTMCNC: 32.5 G/DL (ref 32.4–36.7)
MCV RBC AUTO: 88.7 FL (ref 81.5–99.8)
NRBC-AUTO%: 0 % (ref 0–0.2)
O2 CONCENTRATIION: 40 % (ref 0–100)
P/F RATIO: 320 RATIO
PATIENT RATE: 16
PCO2 BLD: 34 MMHG (ref 34–38)
PLATELET # BLD: 299 10^3/UL (ref 150–400)
PLATELET CLUMPS FLAG: 0 (ref 0–99)
PMV BLD AUTO: 9.4 FL (ref 8.7–11.7)
PO2 BLD: 128 MMHG (ref 65–75)
POTASSIUM SERPL-SCNC: 4.1 MEQ/L (ref 3.5–5.2)
PRESSURE SUPPORT: 10
PROT SERPL-MCNC: 6.1 G/DL (ref 6.3–8.2)
RBC # BLD AUTO: 3.44 10^6/UL (ref 4.18–5.33)
SIMV: YES
SODIUM SERPL-SCNC: 136 MEQ/L (ref 134–144)
TCO2: 24 MEQ/L (ref 23–27)

## 2017-06-09 PROCEDURE — 0WQF0ZZ REPAIR ABDOMINAL WALL, OPEN APPROACH: ICD-10-PCS | Performed by: SURGERY

## 2017-06-09 PROCEDURE — 0B9J8ZX DRAINAGE OF LEFT LOWER LUNG LOBE, VIA NATURAL OR ARTIFICIAL OPENING ENDOSCOPIC, DIAGNOSTIC: ICD-10-PCS

## 2017-06-09 PROCEDURE — 0B9F8ZX DRAINAGE OF RIGHT LOWER LUNG LOBE, VIA NATURAL OR ARTIFICIAL OPENING ENDOSCOPIC, DIAGNOSTIC: ICD-10-PCS

## 2017-06-09 RX ADMIN — IPRATROPIUM BROMIDE AND ALBUTEROL SULFATE SCH: .5; 3 SOLUTION RESPIRATORY (INHALATION) at 21:36

## 2017-06-09 RX ADMIN — Medication SCH MLS: at 21:31

## 2017-06-09 RX ADMIN — Medication SCH MLS: at 17:37

## 2017-06-09 RX ADMIN — Medication SCH MLS: at 12:17

## 2017-06-09 RX ADMIN — IPRATROPIUM BROMIDE AND ALBUTEROL SULFATE SCH: .5; 3 SOLUTION RESPIRATORY (INHALATION) at 11:49

## 2017-06-09 RX ADMIN — CHLORHEXIDINE GLUCONATE SCH ML: 1.2 RINSE ORAL at 21:31

## 2017-06-09 RX ADMIN — HYDROXYZINE HYDROCHLORIDE PRN MG: 50 TABLET ORAL at 06:14

## 2017-06-09 RX ADMIN — METHOCARBAMOL SCH: 500 TABLET ORAL at 21:26

## 2017-06-09 RX ADMIN — PANTOPRAZOLE SODIUM SCH: 40 TABLET, DELAYED RELEASE ORAL at 21:26

## 2017-06-09 RX ADMIN — IPRATROPIUM BROMIDE AND ALBUTEROL SULFATE SCH: .5; 3 SOLUTION RESPIRATORY (INHALATION) at 16:43

## 2017-06-09 RX ADMIN — ALBUTEROL SULFATE SCH PUFFS: 90 AEROSOL, METERED RESPIRATORY (INHALATION) at 20:55

## 2017-06-09 RX ADMIN — PANTOPRAZOLE SODIUM SCH MG: 40 TABLET, DELAYED RELEASE ORAL at 08:50

## 2017-06-09 RX ADMIN — ALBUTEROL SULFATE SCH: 90 AEROSOL, METERED RESPIRATORY (INHALATION) at 17:25

## 2017-06-09 RX ADMIN — IPRATROPIUM BROMIDE AND ALBUTEROL SULFATE SCH ML: .5; 3 SOLUTION RESPIRATORY (INHALATION) at 06:03

## 2017-06-09 RX ADMIN — METHOCARBAMOL SCH MG: 500 TABLET ORAL at 08:51

## 2017-06-09 RX ADMIN — AZITHROMYCIN MONOHYDRATE SCH MLS: 500 INJECTION, POWDER, LYOPHILIZED, FOR SOLUTION INTRAVENOUS at 12:34

## 2017-06-09 RX ADMIN — PROPOFOL SCH MLS: 10 INJECTION, EMULSION INTRAVENOUS at 17:37

## 2017-06-09 RX ADMIN — ERTAPENEM SODIUM SCH MLS: 1 INJECTION, POWDER, LYOPHILIZED, FOR SOLUTION INTRAMUSCULAR; INTRAVENOUS at 12:17

## 2017-06-09 RX ADMIN — OXYCODONE HYDROCHLORIDE PRN MG: 15 TABLET ORAL at 06:14

## 2017-06-09 RX ADMIN — PROPOFOL SCH MLS: 10 INJECTION, EMULSION INTRAVENOUS at 12:17

## 2017-06-09 RX ADMIN — METHOCARBAMOL SCH: 500 TABLET ORAL at 17:38

## 2017-06-09 NOTE — GOP
[f rep st]



                                                                OPERATIVE REPORT





DATE OF OPERATION:  06/09/2017



SURGEON:  Darius Price MD



ASSISTANT:  None.



ANESTHESIA:  General endotracheal.



ANESTHESIOLOGIST:  Dr. Gagnon



PREOPERATIVE DIAGNOSIS:  Incarcerated umbilical hernia.



POSTOPERATIVE DIAGNOSIS:  Incarcerated incisional hernia.



PROCEDURE PERFORMED:  Incarcerated incisional hernia repair primarily.



FINDINGS:  Port site from previous laparoscopic cholecystectomy appeared to have opened.  The preper
itoneal fat was through it.  The hernia sac and contents were successfully reduced.  The hernia kameron
ured approximately 1.5 cm in greatest dimension.  It was closed primarily with interrupted Surgilon 
sutures.



SPECIMENS:  None.



ESTIMATED BLOOD LOSS:  5 cc.



DESCRIPTION OF PROCEDURE:  The patient was greeted in the preoperative suite.  Once again, risks, be
nefits, and alternatives were discussed.  The consent was signed.  She was then brought back to the 
operative suite, placed on the OR table in a supine position.  After all anesthesia machines, includ
ing SCDs, were on and functioning, a [employer] time-out was performed ending with al
l in agreement.  The patient's abdomen was then widely prepped and draped in typical sterile fashion
.  Antibiotics were given on-call to the operating room.  I made an incision just above and to the s
chey[place] of her umbilicus.  I carried this down to the subcutaneous tissue using electrocautery.  Once th
is was successfully done, I identified the hernia sac and successfully reduced it from the surroundi
ng tissue.  I then successfully reduced it into the abdominal cavity.  The fascial edges were gently
 cleansed.  The defect measured approximately 1.5 cm.  I identified no surrounding defects.  Given t
he size and the successful reduction, I chose to repair it using interrupted 0 Surgilon sutures, whi
ch were done in interrupted fashion noting excellent fascial reapproximation with minimal to no tens
ion.  After this was done, I instilled the area with 0.25% Marcaine with epinephrine.  I closed the 
subcutaneous tissue with interrupted 3-0 Vicryl, the skin with a running 4-0 Monocryl over which Oswald
mabond was placed.  The patient was attempted to be extubated in the operative suite but given her u
nderlying pulmonary issues was unsuccessfully extubated and was taken to the ICU intubated.



DRAINS:  None.



COUNTS:  All counts were reported as correct x2.





Job #:  [id number]

## 2017-06-09 NOTE — PCMIDPN
Assessment/Plan: 


Assessment/Plan:


* Bilateral multifocal pulmonary infiltrates:  Infectious versus noninfectious 

etiology.  Patient underwent bronchoscopy today with thick secretions noted.  

Now on empiric ertapenem and azithromycin pending further culture data.  

Overall chest x-ray has improved.  Await BAL studies.  Findings and plan 

reviewed with Dr. Rudolph.





06/09/17 16:54





Subjective: 





Intubated after hernia repair and bronchoscopy.


Objective: 


 Vital Signs











Temp Pulse Resp BP Pulse Ox


 


 37.1 C   61   16   111/83 H  100 


 


 06/09/17 16:00  06/09/17 16:00  06/09/17 16:00  06/09/17 16:00  06/09/17 16:00








 Laboratory Results





 06/09/17 13:20 





 06/09/17 13:20 





 











 06/08/17 06/09/17 06/10/17





 05:59 05:59 05:59


 


Intake Total 


 


Output Total   200


 


Balance 








Ertapenem # 1


Azithromycin # 1


BAL Gram stain and culture pending


Respiratory pathogen panel by PCR negative


 Laboratory Tests











  06/06/17





  10:00


 


Proteinase 3 (PR3)  < 0.2


 


Myeloperoxidase Ab  <0.2


 


Cryptococcus Ag Screen  Negative














- Physical Exam


General Appearance: other (Intubated, sedated)


EENT: ET Tube, NG Tube, No scleral icterus


Respiratory: other (Few coarse crackles on left)


Cardiac/Chest: regular rate, rhythm


Extremities: No inflammation


Skin: No embolic lesions





ICD10 Worksheet


Patient Problems: 


 Problems











Problem Status Onset


 


Hypoxia Acute  


 


Pneumonia Acute  


 


Sepsis Acute

## 2017-06-09 NOTE — PDGENHP
History and Physical





- Chief Complaint


abdominal pain





- History of Present Illness


48yo female admitted to the medical service with pam pulm infiltrates. Has 

known umbilical hernia for the past 2-3 years, reducible in the past. Since she 

has been admitted and coughing states that it has been more painful and 

incarcerated. Pain is sharp, non-radiating, worse with palpation and 7/10 in 

intensity. She denies any nausea or vomiting, has had chills. Continues to 

receive treatment for her pna vs other acute pulm process 





History Information





- Allergies/Home Medication List


Allergies/Adverse Reactions: 








droperidol [From Inapsine] Allergy (Severe, Verified 02/10/17 20:04)


 SEIZURES


haloperidol [From Haldol] Allergy (Severe, Verified 02/10/17 20:04)


 SEIZURES


haloperidol lactate [From Haldol] Allergy (Severe, Verified 02/10/17 20:04)


 SEIZURES





Home Medications: 








Albuterol [Proventil Inhaler HFA (*)] 1 - 2 puffs IH Q4H 06/04/17 [Last Taken 

Unknown]


Ascorbic Acid [Vitamin C 500 mg (*)] 500 mg PO BID 06/04/17 [Last Taken Unknown]


Cholecalciferol Vit D3 [Vitamin D3 (*)] 1,000 units PO DAILY 06/04/17 [Last 

Taken Unknown]


Herbals/Supplements -Info Only 1 ea PO DAILY 06/04/17 [Last Taken 06/04/17]


Methocarbamol 500 mg PO TID 06/04/17 [Last Taken 06/01/17]


hydrOXYzine HCL [Vistaril] 50 mg PO QID PRN 06/04/17 [Last Taken Unknown]


levOFLOXACIN [levAQUIN (*)] 750 mg PO DAILY 06/04/17 [Last Taken 06/04/17]


traMADol [Ultram 50 mg (*)] 50 mg PO Q6 PRN 06/04/17 [Last Taken 06/01/17]





I have personally reviewed and updated: medical history, surgical history





- Past Medical History


Additional medical history: COPD.  GERD/hiatal hernia.  h/o closed traumatic 

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 

headaches.  anxiety disorder





- Surgical History


Reports: cholecystectomy


Additional surgical history: partial hysterectomy.  cholecystectomy.  lipoma 

resection





- Family History


Additional family history: mom had dementia.  dad had emphysema





- Social History


Smoking Status: Current every day smoker


Tobacco Use: Less than 1 pack/day


Alcohol Use: None


Drug Use: None


Additional social history: Patient is originally from [place], left there in 

January and has been homeless/travelling since that time. She has remained in 

the [place] area since end of Jan/early February, now resides in [facility] 

shelter. She reports having siblings and children in the [place] area.





Review of Systems


ROS: 10pt was reviewed & negative except for what was stated in HPI & below





Physical Exam














Temp Pulse Resp BP Pulse Ox


 


 36.9 C   70   16   105/69   95 


 


 06/09/17 08:18  06/09/17 08:18  06/09/17 08:18  06/09/17 08:18  06/09/17 08:18




















O2 (L/minute)                  2














Constitutional: no apparent distress


Eyes: PERRL


Ears, Nose, Mouth, Throat: moist mucous membranes


Cardiovascular: regular rate and rhythym


Respiratory: no respiratory distress, reduced air movement


Gastrointestinal: other (tender supraumbilical hernia, attempted to reduce was 

unsuccessful. No rebound)


Skin: warm


Musculoskeletal: full muscle strength


Neurologic: AAOx3


Psychiatric: interacting appropriately


Lymph, Heme, Immunologic: no cervical LAD





Lab Data & Imaging Review





 06/05/17 01:42





 06/05/17 01:42














WBC  13.31 10^3/uL (3.80-9.50)  H  06/05/17  01:42    


 


RBC  4.22 10^6/uL (4.18-5.33)   06/05/17  01:42    


 


Hgb  12.1 g/dL (12.6-16.3)  L  06/05/17  01:42    


 


Hct  36.4 % (38.0-47.0)  L  06/05/17  01:42    


 


MCV  86.3 fL (81.5-99.8)   06/05/17  01:42    


 


MCH  28.7 pg (27.9-34.1)   06/05/17  01:42    


 


MCHC  33.2 g/dL (32.4-36.7)   06/05/17  01:42    


 


RDW  15.6 % (11.5-15.2)  H  06/05/17  01:42    


 


Plt Count  354 10^3/uL (150-400)  D 06/05/17  01:42    


 


MPV  10.0 fL (8.7-11.7)   06/05/17  01:42    


 


Neut % (Auto)  78.1 % (39.3-74.2)  H  06/05/17  01:42    


 


Lymph % (Auto)  17.4 % (15.0-45.0)   06/05/17  01:42    


 


Mono % (Auto)  2.9 % (4.5-13.0)  L  06/05/17  01:42    


 


Eos % (Auto)  1.1 % (0.6-7.6)   06/05/17  01:42    


 


Baso % (Auto)  0.2 % (0.3-1.7)  L  06/05/17  01:42    


 


Nucleat RBC Rel Count  0.0 % (0.0-0.2)   06/05/17  01:42    


 


Absolute Neuts (auto)  10.39 10^3/uL (1.70-6.50)  H  06/05/17  01:42    


 


Absolute Lymphs (auto)  2.32 10^3/uL (1.00-3.00)   06/05/17  01:42    


 


Absolute Monos (auto)  0.39 10^3/uL (0.30-0.80)   06/05/17  01:42    


 


Absolute Eos (auto)  0.14 10^3/uL (0.03-0.40)   06/05/17  01:42    


 


Absolute Basos (auto)  0.03 10^3/uL (0.02-0.10)   06/05/17  01:42    


 


Absolute Nucleated RBC  0.00 10^3/uL (0-0.01)   06/05/17  01:42    


 


Immature Gran %  0.3 % (0.0-1.1)   06/05/17  01:42    


 


Immature Gran #  0.04 10^3/uL (0.00-0.10)   06/05/17  01:42    


 


Sodium  139 mEq/L (134-144)   06/05/17  01:42    


 


Potassium  4.0 mEq/L (3.5-5.2)   06/05/17  01:42    


 


Chloride  109 mEq/L ()   06/05/17  01:42    


 


Carbon Dioxide  21 mEq/l (22-31)  L  06/05/17  01:42    


 


Anion Gap  9 mEq/L (8-16)   06/05/17  01:42    


 


BUN  14 mg/dL (7-23)   06/05/17  01:42    


 


Creatinine  0.6 mg/dL (0.6-1.0)   06/05/17  01:42    


 


Estimated GFR  > 60   06/05/17  01:42    


 


Glucose  96 mg/dL ()   06/05/17  01:42    


 


Calcium  8.8 mg/dL (8.5-10.4)   06/05/17  01:42    


 


Troponin I  < 0.012 ng/mL (0-0.034)   06/05/17  15:10    


 


Angiotensin Convert Enz  28 U/L (8 - 53)   06/06/17  10:00    


 


Urine Opiates Screen  347 ng/mL (NEGATIVE)   06/05/17  01:48    


 


Urine Barbiturates  NEGATIVE ng/mL (NEGATIVE)   06/05/17  01:48    


 


Ur Phencyclidine Scrn  NEGATIVE ng/mL (NEGATIVE)   06/05/17  01:48    


 


Ur Amphetamines Screen  NEGATIVE ng/mL (NEGATIVE)   06/05/17  01:48    


 


U Benzodiazepines Scrn  393 ng/mL (NEGATIVE)   06/05/17  01:48    


 


Urine Cocaine Screen  NEGATIVE ng/mL (NEGATIVE)   06/05/17  01:48    


 


U Marijuana (THC) Screen  NEGATIVE ng/mL (NEGATIVE)   06/05/17  01:48    


 


Proteinase 3 (PR3)  < 0.2 U  06/06/17  10:00    


 


Myeloperoxidase Ab  <0.2 U  06/06/17  10:00    


 


Cryptococcus Ag Screen  Negative  (Negative)   06/06/17  10:00    








Visualized and Interpreted Chest x-ray results: Yes





Assessment & Plan


Assessment: 








Hypoxia (Acute)








Plan: 





48yo F c incarcerated umbilical hernia


RBA to surgery discussed. Will plan to proceed to the OR for urgent exploration

, reduction and repair. Discussed with IM attending Dr Anaya

## 2017-06-09 NOTE — GPN
[f rep st]



                                                                 PROCEDURE NOTE





DATE OF PROCEDURE:  06/09/2017



REASON FOR PROCEDURE:  Bilateral pulmonary infiltrates of unknown etiology.



PROCEDURE NOTE:  The procedure was done in the intensive care unit.  N95 masks were worn.  Consent f
or the procedure had been discussed with the patient a day prior and was to be done in the operating
 room timed with her hernia repair.  Appropriate time-out was performed. 



Surgery ended up being earlier than anticipated.  The patient could not be extubated following the p
rocedure and was sent to the intensive care unit on the ventilator.  She was stabilized, and broncho
scopy was then performed. 



Versed 2 mg were used in addition to the propofol and fentanyl that she was getting for ventilatory 
protocols. 



The fiberoptic bronchoscope was passed via an adapter on the end of the patient's endotracheal tube 
and in the lower tracheobronchial tree bilaterally.  There was a moderate amount of somewhat thick p
urulent secretions, removed with suction and washes.  Following this, more formal bronchoalveolar la
vage was performed from the right upper lobe and left lower lobe.  80 mL (40 mL in each lobe) was in
stilled.  Approximately half the instillate came back.  Anatomy was normal bilaterally.  There were 
no endobronchial lesions and no evidence of extrinsic compression. 



Appropriate samples were sent:  Cultures from the wash and cell count and differential from the lava
ge. 



The patient tolerated the procedure well.  She became hypoxemic to possibly 60% briefly early on in 
the procedure, but this may have been artifactual as her hand with the pulse oximeter on it, was zaina
nched.  In any case, additional oxygen was provided at 100% and saturations remained in the high 90s
 for the rest of the procedure.  Vital signs remained stable.  There were no complications.





Job #:  [id number]

## 2017-06-09 NOTE — POSTOPPROG
Post Op Note


Date of Operation: 06/09/17


Surgeon: Darius Price


Anesthesiologist: Kalin


Anesthesia: GET(General Endotracheal)


Pre-op Diagnosis: Incarcerated umbilical hernia


Post-op Diagnosis: Incarcerated incisional hernia


Indication: pain


Procedure: open umbilical hernia repair


Findings: 1.5cm defect, repaired primarily


Inf/Abcess present in the surg proc area at time of surgery?: No


EBL: Minimal

## 2017-06-09 NOTE — HOSPPROG
Hospitalist Progress Note


Assessment/Plan: 





DIAGNOSES: 


-Acute hypoxemic respiratory failure


-COPD exacerbations


-History of chronic pulmonary infiltrates


-Acute Incarceration of periumbilical hernia





 the cause of her respiratory failure and pulmonary infiltrates remained 

uncertain.  This is her 3rd recent hospitalization for the same syndrome and we 

have not identified any infectious organisms during any of these episodes.  

Differential diagnosis could include hypersensitivity, immune related, occult 

infection, or other.  She was unable to tolerate bronchoscopy attempt on June 7th.  We are hoping to get bronchoscopy under general anesthesia.  At this 

point in terms of her respirations she has improved to requiring only 2 L nasal 

cannula oxygen and is getting around much better as a much better lung exam.  

Dr. Rudolph does feel that she can tolerate anesthesia for surgery for hernia 

repair,  a


nd I would agree with that.





PLANS:


- I have asked Dr. Price to see the patient.  He has had examined her and 

was unable to successfully reduce her hernia due to pain and is planning to 

take her to the operating room today for repair of that hernia


-will review with Dr. Rudolph to see if he will consider attempting bronchoscopy 

with biopsy during the anesthesia for her hernia


-await serologies for evaluation of her lungs; 





SUBJECTIVE:


still w some dyspnea,  but is now on 2 L oxygen, not coughing, and getting 

around easier than previous


she still  with marked pain at her supra umbilical hernia, aggravated by any 

movement or eating or coughing


 no  symptoms of fever





OBJECTIVE


Vitals reviewed: stable without fever 





Exam:


alert oriented 


skin warm dry color ok


resps  mildly labored at rest on oxygen


lungs  very diminished BSs with some end-expiratory wheeze


heart regular


abd on exam today there is Still an incarcerated hernia superior to umbilicus 

that is fairly tender but no discoloration, swelling or heat of the surrounding 

tissues


limbs warm, no edema


iv site ok








Objective: 


 Vital Signs











Temp Pulse Resp BP Pulse Ox


 


 36.9 C   70   16   105/69   95 


 


 06/09/17 08:18  06/09/17 08:18  06/09/17 08:18  06/09/17 08:18  06/09/17 08:18








 Laboratory Results





 06/05/17 01:42 





 06/05/17 01:42 





 











 06/08/17 06/09/17 06/10/17





 06:59 06:59 06:59


 


Intake Total 375 650 


 


Output Total  200 


 


Balance 375 450 














ICD10 Worksheet


Patient Problems: 


 Problems











Problem Status Onset


 


Hypoxia Acute  


 


Pneumonia Acute  


 


Sepsis Acute

## 2017-06-09 NOTE — PDINTPN
Intensivist Progress Note


Assessment/Plan: 


Assessment:





Acute postoperative respiratory insufficiency.  Inability to extubate the 

patient postop secondary to underlying lung disease as well as bronchitis and 

secretions.  Surgery not the issue.





Bilateral pulmonary infiltrates.  Etiology unclear.  Query infectious 

pneumonitis, query inflammatory lung disease, , EP, etc.  Unable to perform 

bronchoscopy 6/7.  Chest x-ray yesterday afternoon showed improving bilateral 

infiltrates.  All serology is negative.  Changes may indeed be related to 

aspiration pneumonitis?  Now that she is sedated and on the ventilator I will 

perform bronchoscopy, send cultures and cytologies from BAL.





COPD:  With exacerbation, consistent with bronchitis/bronchopneumonia/

pneumonitis.





Umbilical hernia:  Status post herniorrhaphy.  Went well, no issues.





DVT prophylaxis:  None currently.  Will start tomorrow





GI prophylaxis:  On pantoprazole





Metabolic:  Lab pending


























Plan:  Continue ventilatory support.  The patient will be sedated with propofol 

and fentanyl.  I will keep her on the ventilator at least overnight and assess 

weaning parameters tomorrow for possible extubation.  Bronchoscopy will be done 

today.  Deep cultures will be obtained along with bronchoalveolar lavage for 

cell count and differential.  Antibiotics will be given following this.  

Bronchodilator therapies will be continued.  Lab comma ABG and x-ray will be 

followed.





Discussed with surgery, anesthesia, nursing and respiratory therapy.





45 minutes of critical care time was spent directly with the patient, not 

including bronchoscopy.





Subjective: 





Return from the OR on the ventilator.  Anesthesia could not extubate the 

patient postoperatively secondary to secretions and hypoxemia.


Objective: 





 Vital Signs











Temp Pulse Resp BP Pulse Ox


 


 36.9 C   70   16   105/69   95 


 


 06/09/17 08:18  06/09/17 08:18  06/09/17 08:18  06/09/17 08:18  06/09/17 08:18








 Laboratory Results





 06/05/17 01:42 





 06/05/17 01:42 





 











 06/08/17 06/09/17 06/10/17





 05:59 05:59 05:59


 


Intake Total 375 400 250


 


Output Total   200


 


Balance 375 400 50








Lab, radiologic studies, blood gas:  Pending





Physical Exam





- Physical Exam


General Appearance: no apparent distress, unresponsive (Sedated postoperative), 

No alert


EENT: PERRL/EOMI, ET tube


Neck: normal inspection (No JVD)


Respiratory: lungs clear (Anteriorly), decreased breath sounds (At bases), 

rales (Few scattered rales), rhonchi (Rare), No wheezing


Cardiac/Chest: regular rate, rhythm


Abdomen: soft, No normal bowel sounds (Decreased, present)


Pelvic Exam: other (No Conte catheter.  May need this as I expect her to be on 

the ventilator overnight or longer)


Skin: normal color, warm/dry


Extremities: No pedal edema


Neuro/Psych: no motor/sensory deficits (Can't assess), cognition abnormalities (

Can't assess)





ICD10 Worksheet


Patient Problems: 


 Problems











Problem Status Onset


 


Pneumonia Acute  


 


Hypoxia Acute  


 


Sepsis Acute

## 2017-06-10 LAB
% IMMATURE GRANULYOCYTES: 0.5 % (ref 0–1.1)
ABSOLUTE IMMATURE GRANULOCYTES: 0.03 10^3/UL (ref 0–0.1)
ABSOLUTE NRBC COUNT: 0 10^3/UL (ref 0–0.01)
ADD DIFF?: NO
ADD MORPH?: NO
ADD SCAN?: NO
ALBUMIN SERPL-MCNC: 3.3 G/DL (ref 3.5–5)
ALP SERPL-CCNC: 302 IU/L (ref 38–126)
ALT SERPL-CCNC: 409 IU/L (ref 9–52)
ANION GAP SERPL CALC-SCNC: 6 MEQ/L (ref 8–16)
AST SERPL-CCNC: 593 IU/L (ref 14–46)
ATYPICAL LYMPHOCYTE FLAG: 30 (ref 0–99)
BILIRUB SERPL-MCNC: 1.4 MG/DL (ref 0.1–1.4)
CALCIUM SERPL-MCNC: 8.7 MG/DL (ref 8.5–10.4)
CHLORIDE SERPL-SCNC: 106 MEQ/L (ref 97–110)
CO2 SERPL-SCNC: 23 MEQ/L (ref 22–31)
CREAT SERPL-MCNC: 0.5 MG/DL (ref 0.6–1)
ERYTHROCYTE [DISTWIDTH] IN BLOOD BY AUTOMATED COUNT: 15.3 % (ref 11.5–15.2)
FRAGMENT RBC FLAG: 0 (ref 0–99)
GFR SERPL CREATININE-BSD FRML MDRD: > 60 ML/MIN/{1.73_M2}
GLUCOSE SERPL-MCNC: 96 MG/DL (ref 70–100)
HCT VFR BLD CALC: 31.8 % (ref 38–47)
HGB BLD-MCNC: 10.5 G/DL (ref 12.6–16.3)
LEFT SHIFT FLG: 10 (ref 0–99)
LIPEMIA HEMOLYSIS FLAG: 80 (ref 0–99)
MCH RBC BLDCO QN: 28.6 PG (ref 27.9–34.1)
MCHC RBC AUTO-ENTMCNC: 33 G/DL (ref 32.4–36.7)
MCV RBC AUTO: 86.6 FL (ref 81.5–99.8)
NRBC-AUTO%: 0 % (ref 0–0.2)
PLATELET # BLD: 373 10^3/UL (ref 150–400)
PLATELET CLUMPS FLAG: 0 (ref 0–99)
PMV BLD AUTO: 9.8 FL (ref 8.7–11.7)
POTASSIUM SERPL-SCNC: 4.2 MEQ/L (ref 3.5–5.2)
PROT SERPL-MCNC: 6 G/DL (ref 6.3–8.2)
RBC # BLD AUTO: 3.67 10^6/UL (ref 4.18–5.33)
SODIUM SERPL-SCNC: 135 MEQ/L (ref 134–144)

## 2017-06-10 RX ADMIN — IPRATROPIUM BROMIDE AND ALBUTEROL SULFATE SCH: .5; 3 SOLUTION RESPIRATORY (INHALATION) at 05:17

## 2017-06-10 RX ADMIN — IPRATROPIUM BROMIDE AND ALBUTEROL SULFATE SCH ML: .5; 3 SOLUTION RESPIRATORY (INHALATION) at 16:35

## 2017-06-10 RX ADMIN — ALBUTEROL SULFATE SCH PUFFS: 90 AEROSOL, METERED RESPIRATORY (INHALATION) at 11:44

## 2017-06-10 RX ADMIN — Medication SCH MLS: at 04:18

## 2017-06-10 RX ADMIN — PANTOPRAZOLE SODIUM SCH: 40 TABLET, DELAYED RELEASE ORAL at 11:21

## 2017-06-10 RX ADMIN — ALBUTEROL SULFATE SCH PUFFS: 90 AEROSOL, METERED RESPIRATORY (INHALATION) at 04:43

## 2017-06-10 RX ADMIN — IPRATROPIUM BROMIDE AND ALBUTEROL SULFATE SCH: .5; 3 SOLUTION RESPIRATORY (INHALATION) at 08:36

## 2017-06-10 RX ADMIN — ERTAPENEM SODIUM SCH MLS: 1 INJECTION, POWDER, LYOPHILIZED, FOR SOLUTION INTRAMUSCULAR; INTRAVENOUS at 08:13

## 2017-06-10 RX ADMIN — METHOCARBAMOL SCH: 500 TABLET ORAL at 12:18

## 2017-06-10 RX ADMIN — ALBUTEROL SULFATE SCH PUFFS: 90 AEROSOL, METERED RESPIRATORY (INHALATION) at 08:36

## 2017-06-10 RX ADMIN — IPRATROPIUM BROMIDE AND ALBUTEROL SULFATE SCH ML: .5; 3 SOLUTION RESPIRATORY (INHALATION) at 21:07

## 2017-06-10 RX ADMIN — PROPOFOL SCH MLS: 10 INJECTION, EMULSION INTRAVENOUS at 00:15

## 2017-06-10 RX ADMIN — Medication SCH MLS: at 22:10

## 2017-06-10 RX ADMIN — CHLORHEXIDINE GLUCONATE SCH ML: 1.2 RINSE ORAL at 08:50

## 2017-06-10 RX ADMIN — ENOXAPARIN SODIUM SCH MG: 100 INJECTION SUBCUTANEOUS at 19:11

## 2017-06-10 RX ADMIN — Medication SCH MLS: at 08:13

## 2017-06-10 RX ADMIN — AZITHROMYCIN MONOHYDRATE SCH MLS: 500 INJECTION, POWDER, LYOPHILIZED, FOR SOLUTION INTRAVENOUS at 08:15

## 2017-06-10 RX ADMIN — METHOCARBAMOL SCH: 500 TABLET ORAL at 15:59

## 2017-06-10 RX ADMIN — ALBUTEROL SULFATE SCH PUFFS: 90 AEROSOL, METERED RESPIRATORY (INHALATION) at 00:09

## 2017-06-10 NOTE — HOSPPROG
Hospitalist Progress Note


Assessment/Plan: 





This is a 48 y/o female new to my care 6/10/17 s/p hernia repair 6/9/17 with:





#Acute hypoxemic respiratory failure due to COPD exacerbation with h/o  chronic 

pulmonary infiltrates now intubated postoperatively


   -bronchoscopy report 6/9 reviewed 


   -wean from vent as tolerated per pulm





#Acute Incarceration of periumbilical hernia s/p repair 6/9/17


   -continue routine postop care per surgery








#transaminitis of unclear etiology


   -continue to monitor





#developing pneumonia on cxr 6/10 with h/o bilat infiltrates ddx bacterial vs 

viral 


   -cont Erta and azithromycin


   -check procalcitonin 





Pt is high risk 








Subjective: intubated and sedated


Objective: 


 Vital Signs











Temp Pulse Resp BP Pulse Ox


 


 38.3 C   65   16   90/49 L  99 


 


 06/10/17 07:57  06/10/17 08:20  06/10/17 07:57  06/10/17 07:57  06/10/17 08:20








 Microbiology











 06/05/17 01:48 Blood Culture - Final





 Blood 


 


 06/05/17 01:42 Blood Culture - Final





 Blood 


 


 06/09/17 14:00 Gram Stain - Final





 Lung Bilateral - Bronchial Washings 








 Laboratory Results





 06/10/17 04:22 





 06/10/17 04:22 





 











 06/09/17 06/10/17 06/11/17





 05:59 05:59 05:59


 


Intake Total 400 4604 


 


Output Total  1300 


 


Balance 400 3304 








cxr 6/10visualized and personally interpreted shows worsening infiltrates





- Physical Exam


Constitutional: uncomfortable


Eyes: PERRL, anicteric sclera, EOMI


Ears, Nose, Mouth, Throat: moist mucous membranes, hearing normal, ears appear 

normal, no oral mucosal ulcers, other (et tube inplace)


Cardiovascular: regular rate and rhythym, no murmur, rub, or gallop


Respiratory: no respiratory distress, reduced air movement, bronchial breath 

sounds, No rhonchi


Gastrointestinal: distension, other (hypoactive bowel sounds), No guarding, No 

rebound


Genitourinary: no bladder fullness, no bladder tenderness, no renal bruits


Skin: no rashes or abrasions, no fluctuance, no induration





ICD10 Worksheet


Patient Problems: 


 Problems











Problem Status Onset


 


Pneumonia Acute  


 


Hypoxia Acute  


 


Sepsis Acute

## 2017-06-10 NOTE — PDINTPN
Intensivist Progress Note


Assessment/Plan: 


Assessment:





Acute postoperative respiratory insufficiency.  Extubated this morning without 

significant difficulties.  Still have some evidence of bronchitis/pneumonitis.  

On antibiotics and steroids





Bilateral pulmonary infiltrates.  Etiology unclear.  Query infectious 

pneumonitis, query inflammatory lung disease, , EP, etc.  Unable to perform 

bronchoscopy 6/7.  BAL done yesterday while she on the ventilator.  Findings 

are negative thus far.  Bacterial cultures pending.  AFB negative.  No 

significant he eosinophilic is on cell count and differential.  Previous 

serology is negative.  Changes may indeed be related to aspiration pneumonitis?

  On antibiotics and steroids.





COPD:  With exacerbation, consistent with bronchitis/bronchopneumonia/

pneumonitis.





Umbilical hernia:  Status post herniorrhaphy.  Went well, no issues.





DVT prophylaxis:  None currently.  Will start tomorrow





GI prophylaxis:  On pantoprazole





Metabolic:  Lab pending


























Plan:  Continue care in the intensive care unit.  Continue antibiotics, steroids

, and bronchopulmonary treatments.  Follow x-ray and laboratory.  Await on 

further cultures.





Discussed with surgery, hospitalist, nursing, respiratory therapy, and the ICU 

multi disciplinary team..





50 minutes of critical care time was spent directly with the patient.








Subjective: 





On ventilator this morning.  Placed on CPAP for approximately 1 hour.  Became 

agitated but weaning parameters appeared appropriate bus extubated.  Has done 

acceptably since.


Objective: 





 Vital Signs











Temp Pulse Resp BP Pulse Ox


 


 37.6 C   75   20   121/64 H  97 


 


 06/10/17 16:00  06/10/17 16:30  06/10/17 16:00  06/10/17 16:00  06/10/17 16:30








 Microbiology











 06/09/17 14:00 Gram Stain - Final





 Lung Bilateral - Bronchial Washings 


 


 06/09/17 14:00 Mycobacterial Smear (BONILLA) - Final





 Lung Bilateral - Bronchial Washings 


 


 06/05/17 01:48 Blood Culture - Final





 Blood 


 


 06/05/17 01:42 Blood Culture - Final





 Blood 








 Laboratory Results





 06/10/17 04:22 





 06/10/17 04:22 





 











 06/09/17 06/10/17 06/11/17





 05:59 05:59 05:59


 


Intake Total 400 4604 


 


Output Total  1300 2000


 


Balance 400 3304 -2000











 Laboratory Tests











  06/06/17 06/09/17 06/09/17





  10:00 14:00 21:26


 


pCO2    34


 


pO2    128 H


 


ABG pH    7.45


 


O2 Concentration %    40


 


Actual Respiration Rate    16


 


Set Respiration Rate    16


 


Tidal Volume    600


 


PEEP    5


 


Pressure Support    10


 


Calcium   


 


Total Bilirubin   


 


AST   


 


ALT   


 


Albumin   


 


Angiotensin Convert Enz  28  


 


Fluid WBC   292 H 


 


Fluid RBC   437 H 


 


Fluid Neutrophils   17 


 


Fluid Lymphocytes   16 


 


Fluid Eosinophils   1 


 


Fld Meso/Macro/Monocyte   66 


 


Proteinase 3 (PR3)  < 0.2  


 


Myeloperoxidase Ab  <0.2  














  06/10/17





  04:22


 


pCO2 


 


pO2 


 


ABG pH 


 


O2 Concentration % 


 


Actual Respiration Rate 


 


Set Respiration Rate 


 


Tidal Volume 


 


PEEP 


 


Pressure Support 


 


Calcium  8.7


 


Total Bilirubin  1.4  D


 


AST  593 H


 


ALT  409 H


 


Albumin  3.3 L


 


Angiotensin Convert Enz 


 


Fluid WBC 


 


Fluid RBC 


 


Fluid Neutrophils 


 


Fluid Lymphocytes 


 


Fluid Eosinophils 


 


Fld Meso/Macro/Monocyte 


 


Proteinase 3 (PR3) 


 


Myeloperoxidase Ab 








CXR:  Hypoventilatory changes.  Infiltrates are difficult to interpret.  Lines 

and tubes in good position.





Physical Exam





- Physical Exam


General Appearance: other (Lethargic, arousable, pulling at tubes and lines in 

restraints at times.)


EENT: PERRL/EOMI, ET tube, other (NG)


Neck: normal inspection


Respiratory: decreased breath sounds, rales (At bases), rhonchi (Central 

rhonchi present), wheezing (Expiratory wheezes present)


Cardiac/Chest: regular rate, rhythm


Abdomen: normal bowel sounds, non-tender, soft, No hepatomegaly, No splenomegaly


Rectal: other (Conte catheter in place.  Adequate)


Skin: normal color, warm/dry


Extremities: pedal edema (Trace)


Neuro/Psych: no motor/sensory deficits (Moves all extremities), No cognition 

abnormalities (Difficult to evaluate)





ICD10 Worksheet


Patient Problems: 


 Problems











Problem Status Onset


 


Pneumonia Acute  


 


Hypoxia Acute  


 


Sepsis Acute

## 2017-06-10 NOTE — SOAPPROG
SOAP Progress Note


Assessment/Plan: 


Assessment:


























Plan:


POD#1 s/p incarcerated incisonal hernia repair


- Remains intubated, hopefully will wean to extubate this AM


- Abdominal exam reassuring, incision c/d/i and covered with sterile glue


- plans per Dr Live


06/10/17 06:54





Objective: 





 Vital Signs











Temp Pulse Resp BP Pulse Ox


 


 38.3 C   94   16   94/60 L  100 


 


 06/10/17 05:00  06/10/17 06:00  06/10/17 06:00  06/10/17 06:00  06/10/17 06:00








 Microbiology











 06/09/17 14:00 Gram Stain - Final





 Lung Bilateral - Bronchial Washings 








 Laboratory Results





 06/10/17 04:22 





 06/10/17 04:22 





 











 06/09/17 06/10/17 06/11/17





 05:59 05:59 05:59


 


Intake Total 400 4604 


 


Output Total  1300 


 


Balance 400 3304 














ICD10 Worksheet


Patient Problems: 


 Problems











Problem Status Onset


 


Hypoxia Acute  


 


Pneumonia Acute  


 


Sepsis Acute

## 2017-06-10 NOTE — PCMIDPN
Assessment/Plan: 


Assessment/Plan:


* Bilateral multifocal pulmonary infiltrates:  Infectious versus noninfectious 

etiology.  BAL cultures no growth to date.  Extubated without respiratory 

distress. Continue empiric ertapenem and Zithromax.  Follow up BAL cultures as 

ordered.  Follow up procalcitonin as available.


* Transaminitis:  Query related to anesthesia or hypotension.  Continue to 

follow.


* Fever:  New onset fever postoperatively.  May be atelectasis post surgery.  

Also could be related to transaminitis.  Will repeat blood cultures.





06/10/17 12:59





06/10/17 13:01





06/10/17 13:02





Subjective: 





Extubated.  Sleepy but easily arousable.


Objective: 


 Vital Signs











Temp Pulse Resp BP Pulse Ox


 


 38.2 C   105 H  25 H  138/79 H  95 


 


 06/10/17 12:00  06/10/17 12:00  06/10/17 12:00  06/10/17 12:00  06/10/17 12:00








 Microbiology











 06/09/17 14:00 Gram Stain - Final





 Lung Bilateral - Bronchial Washings 


 


 06/09/17 14:00 Mycobacterial Smear (BONILLA) - Final





 Lung Bilateral - Bronchial Washings 


 


 06/05/17 01:48 Blood Culture - Final





 Blood 


 


 06/05/17 01:42 Blood Culture - Final





 Blood 








 Laboratory Results





 06/10/17 04:22 





 06/10/17 04:22 





 











 06/09/17 06/10/17 06/11/17





 05:59 05:59 05:59


 


Intake Total 400 4604 


 


Output Total  1300 


 


Balance 400 3304 








Ertapenem # 2


Azithromycin # 2





- Physical Exam


General Appearance: no apparent distress, non-toxic


EENT: No scleral icterus


Respiratory: lungs clear, No respiratory distress


Cardiac/Chest: tachycardia


Abdomen: tender (Periumbilical region with intact incision)





ICD10 Worksheet


Patient Problems: 


 Problems











Problem Status Onset


 


Hypoxia Acute  


 


Pneumonia Acute  


 


Sepsis Acute

## 2017-06-10 NOTE — SOAPPROG
SOAP Progress Note


Assessment/Plan: 


Assessment:


 WOUND OKAY FOLLOWING A INCARCERATED VENTRAL HERNIA REPAIR / AFEBRILE/ LFTS 

DRAMATICALLY ELEVATED/ BILATERAL PULMONARY INFILTRATES ON CHEST X-RAY/


PATIENT EXTUBATED BUT STILL WITH NG TUBE IN PLACE / ABDOMEN SOFT WITH DECREASED 

BOWEL SOUNDS


 NOT VERY COMMUNICATIVE




















Plan: WILL FOLLOW/ DIET WHEN ABDOMEN IS MORE ACTIVE /





06/10/17 14:22





Objective: 





 Vital Signs











Temp Pulse Resp BP Pulse Ox


 


 38.2 C   81   18   100/60   98 


 


 06/10/17 12:00  06/10/17 14:00  06/10/17 14:00  06/10/17 14:00  06/10/17 14:00








 Microbiology











 06/09/17 14:00 Gram Stain - Final





 Lung Bilateral - Bronchial Washings 


 


 06/09/17 14:00 Mycobacterial Smear (BONILLA) - Final





 Lung Bilateral - Bronchial Washings 


 


 06/05/17 01:48 Blood Culture - Final





 Blood 


 


 06/05/17 01:42 Blood Culture - Final





 Blood 








 Laboratory Results





 06/10/17 04:22 





 06/10/17 04:22 





 











 06/09/17 06/10/17 06/11/17





 05:59 05:59 05:59


 


Intake Total 400 4604 


 


Output Total  1300 


 


Balance 400 3304 














ICD10 Worksheet


Patient Problems: 


 Problems











Problem Status Onset


 


Hypoxia Acute  


 


Pneumonia Acute  


 


Sepsis Acute

## 2017-06-11 LAB
% IMMATURE GRANULYOCYTES: 0.6 % (ref 0–1.1)
ABSOLUTE IMMATURE GRANULOCYTES: 0.07 10^3/UL (ref 0–0.1)
ABSOLUTE NRBC COUNT: 0 10^3/UL (ref 0–0.01)
ADD DIFF?: NO
ADD MORPH?: NO
ADD SCAN?: NO
ALBUMIN SERPL-MCNC: 3.3 G/DL (ref 3.5–5)
ALBUMIN SERPL-MCNC: 3.3 G/DL (ref 3.5–5)
ALP SERPL-CCNC: 306 IU/L (ref 38–126)
ALP SERPL-CCNC: 337 IU/L (ref 38–126)
ALT SERPL-CCNC: 235 IU/L (ref 9–52)
ALT SERPL-CCNC: 291 IU/L (ref 9–52)
ANION GAP SERPL CALC-SCNC: 8 MEQ/L (ref 8–16)
AST SERPL-CCNC: 154 IU/L (ref 14–46)
AST SERPL-CCNC: 97 IU/L (ref 14–46)
ATYPICAL LYMPHOCYTE FLAG: 0 (ref 0–99)
BILIRUB SERPL-MCNC: 0.4 MG/DL (ref 0.1–1.4)
BILIRUB SERPL-MCNC: 0.7 MG/DL (ref 0.1–1.4)
BILIRUBIN-CONJUGATED: 0.2 MG/DL (ref 0–0.5)
BILIRUBIN-UNCONJUGATED: 0.2 MG/DL (ref 0–1.1)
CALCIUM SERPL-MCNC: 9.2 MG/DL (ref 8.5–10.4)
CHLORIDE SERPL-SCNC: 106 MEQ/L (ref 97–110)
CO2 SERPL-SCNC: 23 MEQ/L (ref 22–31)
CREAT SERPL-MCNC: 0.5 MG/DL (ref 0.6–1)
ERYTHROCYTE [DISTWIDTH] IN BLOOD BY AUTOMATED COUNT: 14.7 % (ref 11.5–15.2)
FRAGMENT RBC FLAG: 0 (ref 0–99)
GFR SERPL CREATININE-BSD FRML MDRD: > 60 ML/MIN/{1.73_M2}
GLUCOSE SERPL-MCNC: 135 MG/DL (ref 70–100)
HCT VFR BLD CALC: 34.8 % (ref 38–47)
HGB BLD-MCNC: 11.5 G/DL (ref 12.6–16.3)
LEFT SHIFT FLG: 10 (ref 0–99)
LIPEMIA HEMOLYSIS FLAG: 80 (ref 0–99)
MCH RBC BLDCO QN: 28.5 PG (ref 27.9–34.1)
MCHC RBC AUTO-ENTMCNC: 33 G/DL (ref 32.4–36.7)
MCV RBC AUTO: 86.4 FL (ref 81.5–99.8)
NRBC-AUTO%: 0 % (ref 0–0.2)
PLATELET # BLD: 371 10^3/UL (ref 150–400)
PLATELET CLUMPS FLAG: 0 (ref 0–99)
PMV BLD AUTO: 9.6 FL (ref 8.7–11.7)
POTASSIUM SERPL-SCNC: 3.7 MEQ/L (ref 3.5–5.2)
PROT SERPL-MCNC: 6.3 G/DL (ref 6.3–8.2)
PROT SERPL-MCNC: 6.5 G/DL (ref 6.3–8.2)
RBC # BLD AUTO: 4.03 10^6/UL (ref 4.18–5.33)
SODIUM SERPL-SCNC: 137 MEQ/L (ref 134–144)

## 2017-06-11 RX ADMIN — PANTOPRAZOLE SODIUM SCH: 40 TABLET, DELAYED RELEASE ORAL at 20:46

## 2017-06-11 RX ADMIN — ENOXAPARIN SODIUM SCH MG: 100 INJECTION SUBCUTANEOUS at 08:42

## 2017-06-11 RX ADMIN — Medication SCH MLS: at 08:41

## 2017-06-11 RX ADMIN — IPRATROPIUM BROMIDE AND ALBUTEROL SULFATE SCH ML: .5; 3 SOLUTION RESPIRATORY (INHALATION) at 11:37

## 2017-06-11 RX ADMIN — IPRATROPIUM BROMIDE AND ALBUTEROL SULFATE SCH ML: .5; 3 SOLUTION RESPIRATORY (INHALATION) at 16:14

## 2017-06-11 RX ADMIN — METHOCARBAMOL SCH: 500 TABLET ORAL at 00:10

## 2017-06-11 RX ADMIN — ERTAPENEM SODIUM SCH MLS: 1 INJECTION, POWDER, LYOPHILIZED, FOR SOLUTION INTRAMUSCULAR; INTRAVENOUS at 08:42

## 2017-06-11 RX ADMIN — OXYCODONE HYDROCHLORIDE PRN MG: 15 TABLET ORAL at 21:01

## 2017-06-11 RX ADMIN — AZITHROMYCIN MONOHYDRATE SCH MLS: 500 INJECTION, POWDER, LYOPHILIZED, FOR SOLUTION INTRAVENOUS at 08:42

## 2017-06-11 RX ADMIN — PANTOPRAZOLE SODIUM SCH: 40 TABLET, DELAYED RELEASE ORAL at 00:10

## 2017-06-11 RX ADMIN — PANTOPRAZOLE SODIUM SCH: 40 TABLET, DELAYED RELEASE ORAL at 11:27

## 2017-06-11 RX ADMIN — OXYCODONE HYDROCHLORIDE PRN MG: 15 TABLET ORAL at 14:44

## 2017-06-11 RX ADMIN — SODIUM CHLORIDE SCH MLS: 900 INJECTION, SOLUTION INTRAVENOUS at 21:01

## 2017-06-11 RX ADMIN — IPRATROPIUM BROMIDE AND ALBUTEROL SULFATE SCH ML: .5; 3 SOLUTION RESPIRATORY (INHALATION) at 05:45

## 2017-06-11 RX ADMIN — METHOCARBAMOL SCH: 500 TABLET ORAL at 11:27

## 2017-06-11 RX ADMIN — SODIUM CHLORIDE SCH MLS: 900 INJECTION, SOLUTION INTRAVENOUS at 00:04

## 2017-06-11 RX ADMIN — HYDROXYZINE HYDROCHLORIDE PRN MG: 50 TABLET ORAL at 14:44

## 2017-06-11 RX ADMIN — METHOCARBAMOL SCH MG: 500 TABLET ORAL at 17:36

## 2017-06-11 RX ADMIN — METHOCARBAMOL SCH MG: 500 TABLET ORAL at 21:01

## 2017-06-11 RX ADMIN — IPRATROPIUM BROMIDE AND ALBUTEROL SULFATE SCH ML: .5; 3 SOLUTION RESPIRATORY (INHALATION) at 19:06

## 2017-06-11 NOTE — PDINTPN
Intensivist Progress Note


Assessment/Plan: 


Assessment:





Acute postoperative respiratory insufficiency.  Has done well post extubation.  

Remains somewhat congested with some wheezing min rales.





Bilateral pulmonary infiltrates.  Etiology unclear.  Query infectious 

pneumonitis, query inflammatory lung disease, , EP, etc.  Unable to perform 

bronchoscopy 6/7.  BAL done 6/9 postoperatively while she was on the 

ventilator.  Findings are negative thus far.  Bacterial cultures now growing a 

rare alpha strep.  AFB negative.  No significant eosinophils on cell count/

differential.  Previous serology negative.  Changes may indeed be related to 

aspiration pneumonitis?  On antibiotics and steroids.





COPD:  With exacerbation, consistent with bronchitis/bronchopneumonia/

pneumonitis.





Umbilical hernia:  Status post herniorrhaphy.  Went well, no issues.  Has been 

NPO since surgery.  Cleared for clear liquids.





DVT prophylaxis:  On enoxaparin





GI prophylaxis:  On pantoprazole





Metabolic:  Elevated liver function studies, coming down.  Query etiology.  

Alcohol is denied.  Will get an ultrasound.


























Plan:  Continue care in the intensive care unit today.  Remove NG tube if she 

tolerates orals without problems.  Continue antibiotics, steroids, and 

bronchopulmonary treatments.  Follow x-ray, LFTs and laboratory.  For right 

upper quadrant ultrasound.  Hepatitis screening if not done previously.








Discussed with surgery, hospitalist, nursing, respiratory therapy, and the ICU 

multi disciplinary team.

















Subjective: 





Doing well post extubation.  Denies significant shortness of breath, cough or 

mucus.  No chest pain.  Some abdominal pain postoperatively, back pain.


Objective: 





 Vital Signs











Temp Pulse Resp BP Pulse Ox


 


 36.9 C   92   19   99/61 L  97 


 


 06/11/17 08:00  06/11/17 12:00  06/11/17 12:00  06/11/17 12:00  06/11/17 12:00








 Microbiology











 06/09/17 14:00 Gram Stain - Final





 Lung Bilateral - Bronchial Washings 


 


 06/09/17 14:00 Mycobacterial Smear (BONILLA) - Final





 Lung Bilateral - Bronchial Washings 


 


 06/05/17 01:48 Blood Culture - Final





 Blood 


 


 06/05/17 01:42 Blood Culture - Final





 Blood 








 Laboratory Results





 06/11/17 05:34 





 06/11/17 05:34 





 











 06/10/17 06/11/17 06/12/17





 05:59 05:59 05:59


 


Intake Total 4604 1700 


 


Output Total 1300 3300 


 


Balance 3304 -1600 











 Laboratory Tests











  06/11/17





  05:34


 


Calcium  9.2


 


Total Bilirubin  0.7


 


AST  154 H


 


ALT  291 H


 


Albumin  3.3 L








CXR:  Pulmonary infiltrates are improved, with patchy changes resolving.  Some 

reticular nodular changes persist bilaterally.





Physical Exam





- Physical Exam


General Appearance: alert, no apparent distress, obese


EENT: other (Nasal cannula in place at 3 L. NG tube in place.  Bilious drainage.

)


Neck: normal inspection (No JVD)


Respiratory: decreased breath sounds (Bilaterally), rales (Few scattered rales 

at the bases), wheezing (Expiratory wheezes and congestion, with a few opening 

sounds.), No rhonchi


Cardiac/Chest: regular rate, rhythm (Distant heart tones), No gallop


Abdomen: soft, No normal bowel sounds (Decreased, present), No non-tender (

Mildly tender to palpation)


Pelvic Exam: other (Conte catheter in place, good urine output)


Skin: normal color, warm/dry


Extremities: No pedal edema


Neuro/Psych: no motor/sensory deficits, No cognition abnormalities (Lethargic, 

arousable, responsive)





ICD10 Worksheet


Patient Problems: 


 Problems











Problem Status Onset


 


Pneumonia Acute  


 


Hypoxia Acute  


 


Sepsis Acute

## 2017-06-11 NOTE — SOAPPROG
SOAP Progress Note


Assessment/Plan: 


Assessment:


 WOUND OKAY FOLLOWING A INCARCERATED VENTRAL HERNIA REPAIR / AFEBRILE/ LFTS 

DRAMATICALLY ELEVATED/ BILATERAL PULMONARY INFILTRATES ON CHEST X-RAY/


PATIENT EXTUBATED BUT STILL WITH NG TUBE IN PLACE / ABDOMEN SOFT WITH DECREASED 

BOWEL SOUNDS


 NOT VERY COMMUNICATIVE




















Plan: WILL FOLLOW/ DIET WHEN ABDOMEN IS MORE ACTIVE /





06/10/17 14:22





06/11/17 11:35


DOING WELL/ AFEBRILE/ +FLATUS AND BM


Objective: 





 Vital Signs











Temp Pulse Resp BP Pulse Ox


 


 36.9 C   79   23 H  115/62   98 


 


 06/11/17 08:00  06/11/17 08:00  06/11/17 08:00  06/11/17 08:00  06/11/17 08:00








 Microbiology











 06/09/17 14:00 Gram Stain - Final





 Lung Bilateral - Bronchial Washings 


 


 06/09/17 14:00 Mycobacterial Smear (BONILLA) - Final





 Lung Bilateral - Bronchial Washings 


 


 06/05/17 01:48 Blood Culture - Final





 Blood 


 


 06/05/17 01:42 Blood Culture - Final





 Blood 








 Laboratory Results





 06/11/17 05:34 





 06/11/17 05:34 





 











 06/10/17 06/11/17 06/12/17





 05:59 05:59 05:59


 


Intake Total 4604 1700 


 


Output Total 1300 3300 


 


Balance 3304 -1600 














ICD10 Worksheet


Patient Problems: 


 Problems











Problem Status Onset


 


Hypoxia Acute  


 


Pneumonia Acute  


 


Sepsis Acute

## 2017-06-11 NOTE — HOSPPROG
Hospitalist Progress Note


Assessment/Plan: 


47-year-old homeless woman admitted with fever shortness of breath and 

bilateral pulmonary infiltrates.  The infiltrates have been present previously 

and evaluation had included some serologies all of which have been fairly 

unremarkable is to diagnosis.  With her cough she developed worsening umbilical 

hernia so underwent a hernia repair on 6/9/2017, during this repair she did 

have a bronchoscopy done with sputum samples.  She remained intubated 

postoperatively for 24 hours and is currently extubated.





#  acute hypoxemic respiratory failure due to COPD exacerbation as well as 

history of chronic pulmonary infiltrates.  She is status post extubation and 

doing quite well with minimal cough currently.  Treating for possible pneumonia 

noted on chest x-ray and CT scan.


* Continue steroids and antibiotics


* Follow respiratory status closely


* Unable to do biopsy perioperatively because of patient being intubated 

however if infiltrates persist the next step would be a lung biopsy.





#  acute incarceration of periumbilical hernia status post repair on 6/9/2017


* Postop care per surgery





#  transaminitis of unclear etiology.  LFTs are improving, however unclear as 

to the cause.


* Check ultrasound


* Check hepatitis serology


* Continue follow-up TS





#  homelessness





#  DVT and GI prophylaxis


Subjective: Patient fairly lethargic obey simple commands.  No complaints.  

Patient new to me and chart reviewed


Objective: 


 Vital Signs











Temp Pulse Resp BP Pulse Ox


 


 36.9 C   92   19   99/61 L  97 


 


 06/11/17 08:00  06/11/17 12:00  06/11/17 12:00  06/11/17 12:00  06/11/17 12:00








 Microbiology











 06/09/17 14:00 Gram Stain - Final





 Lung Bilateral - Bronchial Washings 


 


 06/09/17 14:00 Mycobacterial Smear (BONILLA) - Final





 Lung Bilateral - Bronchial Washings 








 Laboratory Results





 06/11/17 05:34 





 06/11/17 05:34 





 











 06/10/17 06/11/17 06/12/17





 05:59 05:59 05:59


 


Intake Total 4604 1700 


 


Output Total 1300 3300 


 


Balance 3304 -1600 














- Physical Exam


Constitutional: not in pain, chronically ill appearing


Eyes: PERRL, anicteric sclera


Ears, Nose, Mouth, Throat: moist mucous membranes, other (NG tube in place)


Cardiovascular: regular rate and rhythym, no murmur, rub, or gallop


Respiratory: no respiratory distress, inspiratory crackles


Gastrointestinal: soft, non-tender abdomen, No normoactive bowel sounds (

Diminished)


Genitourinary: no bladder fullness


Skin: warm, normal color


Musculoskeletal: full muscle strength, generalized weakness


Psychiatric: other (Sleepy)





ICD10 Worksheet


Patient Problems: 


 Problems











Problem Status Onset


 


Pneumonia Acute  


 


Hypoxia Acute  


 


Sepsis Acute

## 2017-06-11 NOTE — PCMIDPN
Assessment/Plan: 


Assessment/Plan:


* Bilateral multifocal pulmonary infiltrates:  Clinically improved.  Infectious 

versus noninfectious etiology.  BAL cultures with growth of alpha hemolytic 

Streptococcus, not Streptococcus pneumoniae.  Likely represents or pharyngeal 

Streptococcus.  Could be true pathogen if due to aspiration although also could 

represent colonization from or pharyngeal kalin.  Have asked lab to further 

evaluate.  Continue empiric ertapenem and azithromycin.


* Transaminitis:  Query related to anesthesia or hypotension.  Improving.  

Ultrasound without focal findings.


* Fever:  Significant decrease in temperature.  Blood cultures have been no 

growth.





06/11/17 18:04





Subjective: 





Patient feels improved.  Complains of periumbilical pain at surgical site.  

Overall breathing improving.


Objective: 


 Vital Signs











Temp Pulse Resp BP Pulse Ox


 


 36.4 C   87   24 H  92/54 L  98 


 


 06/11/17 17:36  06/11/17 17:36  06/11/17 17:36  06/11/17 17:36  06/11/17 17:36








 Microbiology











 06/09/17 14:00 Gram Stain - Final





 Lung Bilateral - Bronchial Washings 








 Laboratory Results





 06/11/17 05:34 





 06/11/17 05:34 





 











 06/10/17 06/11/17 06/12/17





 05:59 05:59 05:59


 


Intake Total 4604 1700 750


 


Output Total 1300 3300 500


 


Balance 3304 -1600 250








Ertapenem # 3


Azithromycin # 3


BAL cultures with growth of alpha hemolytic Streptococcus, not Streptococcus 

pneumoniae


Blood cultures 6/10/2017 pending


Chest x-ray with stable bilateral pulmonary infiltrates





- Physical Exam


General Appearance: alert, no apparent distress


EENT: No scleral icterus, No thrush


Respiratory: crackles (Scattered bilaterally)


Cardiac/Chest: regular rate, rhythm


Abdomen: non-tender, No distended





ICD10 Worksheet


Patient Problems: 


 Problems











Problem Status Onset


 


Hypoxia Acute  


 


Pneumonia Acute  


 


Sepsis Acute

## 2017-06-12 LAB
ALBUMIN SERPL-MCNC: 3.1 G/DL (ref 3.5–5)
ALP SERPL-CCNC: 253 IU/L (ref 38–126)
ALT SERPL-CCNC: 180 IU/L (ref 9–52)
ANION GAP SERPL CALC-SCNC: 11 MEQ/L (ref 8–16)
AST SERPL-CCNC: 52 IU/L (ref 14–46)
BILIRUB SERPL-MCNC: 0.4 MG/DL (ref 0.1–1.4)
BILIRUBIN-CONJUGATED: 0.3 MG/DL (ref 0–0.5)
BILIRUBIN-UNCONJUGATED: 0.1 MG/DL (ref 0–1.1)
CALCIUM SERPL-MCNC: 9 MG/DL (ref 8.5–10.4)
CHLORIDE SERPL-SCNC: 106 MEQ/L (ref 97–110)
CO2 SERPL-SCNC: 22 MEQ/L (ref 22–31)
CREAT SERPL-MCNC: 0.5 MG/DL (ref 0.6–1)
ERYTHROCYTE [DISTWIDTH] IN BLOOD BY AUTOMATED COUNT: 15.2 % (ref 11.5–15.2)
GFR SERPL CREATININE-BSD FRML MDRD: > 60 ML/MIN/{1.73_M2}
GLUCOSE SERPL-MCNC: 136 MG/DL (ref 70–100)
HCT VFR BLD CALC: 32.1 % (ref 38–47)
HGB BLD-MCNC: 10.4 G/DL (ref 12.6–16.3)
LIPEMIA HEMOLYSIS FLAG: 80 (ref 0–99)
MCH RBC BLDCO QN: 28.3 PG (ref 27.9–34.1)
MCHC RBC AUTO-ENTMCNC: 32.4 G/DL (ref 32.4–36.7)
MCV RBC AUTO: 87.5 FL (ref 81.5–99.8)
PLATELET # BLD: 382 10^3/UL (ref 150–400)
PLATELET CLUMPS FLAG: 0 (ref 0–99)
POTASSIUM SERPL-SCNC: 4.3 MEQ/L (ref 3.5–5.2)
PROT SERPL-MCNC: 5.9 G/DL (ref 6.3–8.2)
RBC # BLD AUTO: 3.67 10^6/UL (ref 4.18–5.33)
SODIUM SERPL-SCNC: 139 MEQ/L (ref 134–144)

## 2017-06-12 RX ADMIN — ENOXAPARIN SODIUM SCH MG: 100 INJECTION SUBCUTANEOUS at 09:32

## 2017-06-12 RX ADMIN — OXYCODONE HYDROCHLORIDE PRN MG: 15 TABLET ORAL at 09:43

## 2017-06-12 RX ADMIN — AZITHROMYCIN MONOHYDRATE SCH: 500 INJECTION, POWDER, LYOPHILIZED, FOR SOLUTION INTRAVENOUS at 10:13

## 2017-06-12 RX ADMIN — METHOCARBAMOL SCH MG: 500 TABLET ORAL at 15:50

## 2017-06-12 RX ADMIN — IPRATROPIUM BROMIDE AND ALBUTEROL SULFATE SCH ML: .5; 3 SOLUTION RESPIRATORY (INHALATION) at 05:46

## 2017-06-12 RX ADMIN — OXYCODONE HYDROCHLORIDE PRN MG: 15 TABLET ORAL at 15:37

## 2017-06-12 RX ADMIN — SODIUM CHLORIDE SCH MLS: 900 INJECTION, SOLUTION INTRAVENOUS at 10:00

## 2017-06-12 RX ADMIN — METHOCARBAMOL SCH MG: 500 TABLET ORAL at 09:30

## 2017-06-12 RX ADMIN — HYDROXYZINE HYDROCHLORIDE PRN MG: 50 TABLET ORAL at 12:54

## 2017-06-12 RX ADMIN — PANTOPRAZOLE SODIUM SCH MG: 40 TABLET, DELAYED RELEASE ORAL at 09:31

## 2017-06-12 RX ADMIN — IPRATROPIUM BROMIDE AND ALBUTEROL SULFATE SCH ML: .5; 3 SOLUTION RESPIRATORY (INHALATION) at 12:02

## 2017-06-12 RX ADMIN — PANTOPRAZOLE SODIUM SCH MG: 40 TABLET, DELAYED RELEASE ORAL at 20:38

## 2017-06-12 RX ADMIN — METHOCARBAMOL SCH MG: 500 TABLET ORAL at 20:38

## 2017-06-12 RX ADMIN — IPRATROPIUM BROMIDE AND ALBUTEROL SULFATE SCH ML: .5; 3 SOLUTION RESPIRATORY (INHALATION) at 17:48

## 2017-06-12 RX ADMIN — OXYCODONE HYDROCHLORIDE PRN MG: 15 TABLET ORAL at 19:33

## 2017-06-12 RX ADMIN — IPRATROPIUM BROMIDE AND ALBUTEROL SULFATE SCH ML: .5; 3 SOLUTION RESPIRATORY (INHALATION) at 21:13

## 2017-06-12 RX ADMIN — ERTAPENEM SODIUM SCH MLS: 1 INJECTION, POWDER, LYOPHILIZED, FOR SOLUTION INTRAMUSCULAR; INTRAVENOUS at 09:30

## 2017-06-12 NOTE — PDINTPN
Intensivist Progress Note


Assessment/Plan: 


Assessment/plan:





47 F with persistent, but evolving and variable infiltrates on CT and chronic 

cough, SOB admitted 6/4/17 for recurrent symptoms. I had advised bronchoscopy 

at a previous admission, but the patient declined and failed to show up for 

outpatient followup as directed. After re-admission, Dr. Rudolph attempted bronch 

with conscious sedation, but she proved too difficult to sedate (tolerance). 

She also had evidence of a strangulated umbilical hernia which was repaired and 

allowed for bronch while intubated. However, results have been unrevealing, 

though AFB has been negative and there were no significant eosinophils (in 

periphery either). On imaging, the infiltrates have a "fleeting" nature, as do 

several nodules. She has been treated with steroids and nebs, as well as 

antibiotics and reports improvement. 





* Abnormal CT scan- I suspect chronic aspiration, of uncertain origin. Doubt 

her umbilical hernia played a role. In either case, her steroids have been 

tapered and she is now on 60 mg daily of prednisone, which I would drop to 40 

after 3 days, then 30 x3, etc. She should also complete her antibiotics. I am 

not in favor of a lung biopsy at this time.


* COPD- I am suspect of the diagnosis due to her relatively young age. Until 

she can perform outpatient PFTs, I would assume its accurate and continue 

therapy as above. 


* Hypoxemia- titrate as tolerated











Objective: 





 Vital Signs











Temp Pulse Resp BP Pulse Ox


 


 36.9 C   84   18   112/82 H  98 


 


 06/12/17 15:31  06/12/17 15:31  06/12/17 15:31  06/12/17 15:31  06/12/17 15:31








 Microbiology











 06/09/17 14:00 Gram Stain - Final





 Lung Bilateral - Bronchial Washings 








 Laboratory Results





 06/12/17 04:40 





 06/12/17 04:40 





 











 06/11/17 06/12/17 06/13/17





 05:59 05:59 05:59


 


Intake Total 1700 1462 


 


Output Total 3300 500 


 


Balance -1600 962 














Physical Exam





- Physical Exam


General Appearance: alert, no apparent distress, obese


EENT: PERRL/EOMI


Neck: supple


Respiratory: lungs clear, decreased breath sounds


Cardiac/Chest: regular rate, rhythm, No edema


Abdomen: normal bowel sounds, non-tender, soft, No distended


Skin: normal color, warm/dry


Lymphatic: no adenopathy


Extremities: No pedal edema


Neuro/Psych: alert, normal mood/affect, oriented x 3





ICD10 Worksheet


Patient Problems: 


 Problems











Problem Status Onset


 


Hypoxia Acute  


 


Pneumonia Acute  


 


Sepsis Acute

## 2017-06-12 NOTE — SOAPPROG
SOAP Progress Note


Assessment/Plan: 


Assessment/Plan: 47 Y homeless female, COPD, PNA, elevated LFTs, now s/p repair 

of incarcerated periumbilical hernia. 





Ileus. NG clamped. Tolerating clears. +BM and flatus. D/c NGT. Start light 

diet. 





S: awake, sitting up in bed eating jello. c/o sore throat with ngt. +BM and 

flatus. Mild nausea.


O:


alert, nad


ncat, mmm


no wob


abd soft, inc cdi, appropriately tender





06/12/17 10:12





Objective: 





 Vital Signs











Temp Pulse Resp BP Pulse Ox


 


 37.0 C   65   18   118/71   87 L


 


 06/12/17 08:40  06/12/17 08:40  06/12/17 08:40  06/12/17 08:40  06/12/17 08:40








 Microbiology











 06/09/17 14:00 Gram Stain - Final





 Lung Bilateral - Bronchial Washings 








 Laboratory Results





 06/12/17 04:40 





 06/12/17 04:40 





 











 06/11/17 06/12/17 06/13/17





 05:59 05:59 05:59


 


Intake Total 1700 1462 


 


Output Total 3300 500 


 


Balance -1600 962 














ICD10 Worksheet


Patient Problems: 


 Problems











Problem Status Onset


 


Hypoxia Acute  


 


Pneumonia Acute  


 


Sepsis Acute

## 2017-06-12 NOTE — PCMIDPN
Assessment/Plan: 


Assessment:


Bilateral multifocal infiltrates.  Patient developed fever 3 days ago and was 

treated empirically with both ertapenem and azithromycin.  She defervesced 

rapidly.  She remains afebrile.  Cultures from bronchoscopy reveal alpha 

hemolytic strep which is not pneumoniae.  This does cast suspicion upon silent 

aspiration as the possible cause for her bilateral infiltrates.  She does 

apparently have underlying hiatal hernia which may or may not contribute to 

this.  Plan to continue the ertapenem and azithromycin for now.  Likely short 

course for 7 days total.  Will discuss with hospitalist regarding strategies 

for diagnosing the reflux and aspiration issues.











Plan:


1.  Continue both ertapenem and azithromycin.


2. Discussed with hospitalist regarding reflux and aspiration potential.


3. Follow her temp curve and clinical course.  Also follow up on final 

identification of isolates from BAL.




















Subjective: 





Patient resting in her hospital bed.  She claims that she is feeling better and 

is breathing better.  Tolerating ertapenem and azithromycin without issue.


Objective: 


Ertapenem #4


Azithromycin #4 Vital Signs











Temp Pulse Resp BP Pulse Ox


 


 37.0 C   65   18   118/71   87 L


 


 06/12/17 08:40  06/12/17 08:40  06/12/17 08:40  06/12/17 08:40  06/12/17 08:40








 Microbiology











 06/09/17 14:00 Gram Stain - Final





 Lung Bilateral - Bronchial Washings 








 Laboratory Results





 06/12/17 04:40 





 06/12/17 04:40 





 











 06/11/17 06/12/17 06/13/17





 05:59 05:59 05:59


 


Intake Total 1700 1462 


 


Output Total 3300 500 


 


Balance -1600 962 














- Physical Exam


General Appearance: WD/WN, alert, no apparent distress, non-toxic


Respiratory: crackles (Scattered bilateral), No lungs clear, No normal breath 

sounds, No bronchial breath sounds, No coarse breath sounds


Cardiac/Chest: regular rate, rhythm, No tachycardia


Skin: normal color, warm/dry, No rash


Neuro/Psych: alert, normal mood/affect, oriented x 3





ICD10 Worksheet


Patient Problems: 


 Problems











Problem Status Onset


 


Hypoxia Acute  


 


Pneumonia Acute  


 


Sepsis Acute

## 2017-06-12 NOTE — HOSPPROG
Hospitalist Progress Note


Assessment/Plan: 


47-year-old homeless woman admitted with fever shortness of breath and 

bilateral pulmonary infiltrates.  The infiltrates have been present previously 

and evaluation had included some serologies all of which have been fairly 

unremarkable is to diagnosis.  With her cough she developed worsening umbilical 

hernia so underwent a hernia repair on 6/9/2017, during this repair she did 

have a bronchoscopy done with sputum samples.  She remained intubated 

postoperatively for 24 hours and is currently extubated.





#  acute hypoxemic respiratory failure due to COPD exacerbation as well as 

history of chronic pulmonary infiltrates.  She is status post extubation and 

doing quite well with minimal cough currently.  Treating for possible pneumonia 

noted on chest x-ray and CT scan.


* Continue steroids, change to prednisone today


* Continue abx, transition to PO at discharge


* recheck CXR in am, anticipate dc if oxygen req continue to improve.


* Follow respiratory status closely


* Unable to do biopsy perioperatively because of patient being intubated 

however if infiltrates persist the next step would be a lung biopsy.





#  acute incarceration of periumbilical hernia status post repair on 6/9/2017


* Postop care per surgery





#  transaminitis of unclear etiology.  LFTs are improving, however unclear as 

to the cause.  She states she has had elevated enzymes in the past, US shows 

mild hepatomegaly and Hep serologies are negative


* FU in am


* will need outpatient follow up, she sees [facility].





#  homelessness, recently relocated from [place] after the death of her 

Mother in December.





#  DVT and GI prophylaxis


Subjective: Feeling better.  still mild SOB.  no abdo pain,.  history of 

elevated LFT in the past in [facility] per patient.


Objective: 


 Vital Signs











Temp Pulse Resp BP Pulse Ox


 


 36.8 C   92   18   109/63   91 L


 


 06/12/17 12:58  06/12/17 12:58  06/12/17 12:58  06/12/17 12:58  06/12/17 12:58








 Microbiology











 06/09/17 14:00 Gram Stain - Final





 Lung Bilateral - Bronchial Washings 








 Laboratory Results





 06/12/17 04:40 





 06/12/17 04:40 





 











 06/11/17 06/12/17 06/13/17





 05:59 05:59 05:59


 


Intake Total 1700 1462 


 


Output Total 3300 500 


 


Balance -1600 962 














- Physical Exam


Constitutional: no apparent distress, chronically ill appearing


Eyes: PERRL, EOMI


Ears, Nose, Mouth, Throat: moist mucous membranes


Cardiovascular: regular rate and rhythym, no murmur, rub, or gallop


Respiratory: no respiratory distress, reduced air movement, inspiratory crackles

, No expiratory wheeze


Gastrointestinal: normoactive bowel sounds, no palpable masses, tenderness (

mild RUQ tenderness.)


Skin: warm


Musculoskeletal: generalized weakness


Neurologic: AAOx3


Psychiatric: interacting appropriately, not anxious, not encephalopathic





ICD10 Worksheet


Patient Problems: 


 Problems











Problem Status Onset


 


Pneumonia Acute  


 


Hypoxia Acute  


 


Sepsis Acute

## 2017-06-13 VITALS — SYSTOLIC BLOOD PRESSURE: 114 MMHG | DIASTOLIC BLOOD PRESSURE: 71 MMHG

## 2017-06-13 VITALS — OXYGEN SATURATION: 91 % | RESPIRATION RATE: 20 BRPM | HEART RATE: 81 BPM

## 2017-06-13 VITALS — TEMPERATURE: 98.1 F

## 2017-06-13 LAB
ANION GAP SERPL CALC-SCNC: 5 MEQ/L (ref 8–16)
CALCIUM SERPL-MCNC: 9.4 MG/DL (ref 8.5–10.4)
CHLORIDE SERPL-SCNC: 106 MEQ/L (ref 97–110)
CO2 SERPL-SCNC: 25 MEQ/L (ref 22–31)
CREAT SERPL-MCNC: 0.5 MG/DL (ref 0.6–1)
GFR SERPL CREATININE-BSD FRML MDRD: > 60 ML/MIN/{1.73_M2}
GLUCOSE SERPL-MCNC: 132 MG/DL (ref 70–100)
POTASSIUM SERPL-SCNC: 4.4 MEQ/L (ref 3.5–5.2)
SODIUM SERPL-SCNC: 136 MEQ/L (ref 134–144)

## 2017-06-13 RX ADMIN — HYDROXYZINE HYDROCHLORIDE PRN MG: 50 TABLET ORAL at 00:41

## 2017-06-13 RX ADMIN — PANTOPRAZOLE SODIUM SCH MG: 40 TABLET, DELAYED RELEASE ORAL at 08:29

## 2017-06-13 RX ADMIN — ENOXAPARIN SODIUM SCH MG: 100 INJECTION SUBCUTANEOUS at 08:29

## 2017-06-13 RX ADMIN — OXYCODONE HYDROCHLORIDE PRN MG: 15 TABLET ORAL at 05:46

## 2017-06-13 RX ADMIN — OXYCODONE HYDROCHLORIDE PRN MG: 15 TABLET ORAL at 02:02

## 2017-06-13 RX ADMIN — IPRATROPIUM BROMIDE AND ALBUTEROL SULFATE SCH ML: .5; 3 SOLUTION RESPIRATORY (INHALATION) at 10:00

## 2017-06-13 RX ADMIN — IPRATROPIUM BROMIDE AND ALBUTEROL SULFATE SCH ML: .5; 3 SOLUTION RESPIRATORY (INHALATION) at 05:12

## 2017-06-13 RX ADMIN — METHOCARBAMOL SCH MG: 500 TABLET ORAL at 08:28

## 2017-06-13 RX ADMIN — ERTAPENEM SODIUM SCH MLS: 1 INJECTION, POWDER, LYOPHILIZED, FOR SOLUTION INTRAMUSCULAR; INTRAVENOUS at 08:29

## 2017-06-13 NOTE — GDS
[f rep st]



                                                             DISCHARGE SUMMARY





DIAGNOSES:  

1.  Recurrent pulmonary infiltrates, suspicion for recurrent aspiration.

2.  Chronic obstructive pulmonary disease.

3.  Acute respiratory failure, resolved.

4.  Transaminitis of unclear etiology.  Liver function tests are improving.  Workup negative in the 
hospital.

5.  Acute incarceration of periumbilical hernia, status post repair on 06/09/2017.

6.  Homelessness.



PROCEDURES DONE:  

1.  Abdominal and chest CT scan:  Bilateral infiltrates noted and constipation on the abdominal CT s
can.

2.  Esophagram:  Negative for aspiration.  Moderate gastroesophageal reflux disease.

3.  Bronchoscopy:  Perioperatively during the umbilical hernia repair, with bronchoalveolar lavage d
one.

4.  Umbilical hernia repair by Dr. Darius Price.  Will follow up as an outpatient.

5.  Abdominal ultrasound:  Mild hepatomegaly.



CONSULTATIONS:  Pulmonology, Infectious Disease, General Surgery.



HOSPITAL COURSE:  The patient is a 47-year-old initially admitted on 06/04/2017 with fever and short
ness of breath.  CT scan was done, which revealed bilateral infiltrates.  She was admitted previousl
y 3 months ago with similar findings and was treated with Tamiflu and Levaquin after influenza B cam
e back.  She did improve from that, and upon return to the hospital, it was opted to observe her off
 antibiotics.  She was placed on steroids.  They did a barium swallow, which ruled out aspiration an
d underwent attempted bronchoscopy.  She was unable to cooperate with the bronchoscopy, so she was m
onitored in the hospital, and antibiotics were started after she was unable to proceed with bronchos
copy.  Because of this unclear etiology, it was felt that her symptoms were possibly inflammatory ve
rsus COPD versus infection, so she was started on antibiotics, placed on nebulizers and steroids, an
d she did slowly improve.  However, she did have a significant cough and developed a strangulated um
bilical hernia, requiring urgent surgery by Dr. Price.  This was done on 06/09/2017.  Postopera
tively, she was intubated and placed in the intensive care unit.  She was able to be extubated, and 
over the course of the rest of her hospitalization, her respiratory status returned back to normal, 
and she did quite well.  



She did develop elevated liver enzymes throughout her hospitalization.  Evaluation included hepatiti
s serologies and ultrasound, both of which were fairly unremarkable.  Her LFTs are slowly resolving 
at the time of discharge, but will need further followup as an outpatient.  She does recall having e
levated liver enzymes in the past, but cannot give me any details.  They were normal previously in [facility].  At the time of discharge.  It is felt her symptoms are most likely related to recurren
t aspiration.  She is responding to antibiotics and steroids and will continue that at the time of d
ischarge.  She does need to follow up with her primary care provider for re-evaluation of her lung f
unction, possible consultation with Pulmonology and recheck of her LFTs.



CONDITION ON DISCHARGE:  Good.  She is saturating at 96% on room air.  Heart rate 81, blood pressure
 114/71.  She is alert and oriented.  Her lungs have occasional crackles, but otherwise clear with d
iminished breath sounds.  She is alert and oriented.



DISCHARGE MEDICATIONS:  Please see discharge medication form.  She will take 7 more days of Augmenti
n and will start a prednisone taper.



FOLLOWUP:  She should schedule followup with her primary care provider next week.  She needs to make
 sure that she has a scheduled appointment with Pulmonology, and she also needs to have her liver en
zymes rechecked.  She should also follow up with Dr. Price in 1-2 weeks. 



Total time spent with patient on day of discharge and coordination of care is 35 minutes.





Job #:  [id number]

## 2017-06-13 NOTE — SOAPPROG
SOAP Progress Note


Assessment/Plan: 


Assessment/Plan: 47 Y homeless female, COPD, PNA, elevated LFTs, now s/p repair 

of incarcerated periumbilical hernia. 





Doing well from surgery. Ileus resolved. NG out. Eating. +BMs. Dispo pending 

medical clearance. D/c info placed in d/c plan--F/u with Dr. Price once d/c

'ed. 





S: awake, eating. no nausea. pain controlled.


O:


alert, nad


ncat, mmm


no wob


abd soft, inc cdi, appropriately tender





Seen with Dr. Bowen. 





06/13/17 11:58





Objective: 





 Vital Signs











Temp Pulse Resp BP Pulse Ox


 


 36.7 C   81   20   114/71   91 L


 


 06/13/17 09:30  06/13/17 10:19  06/13/17 10:19  06/13/17 10:19  06/13/17 10:19








 Microbiology











 06/09/17 14:00 Mycobacterial Smear (BONILLA) - Final





 Lung Bilateral - Bronchial Washings 


 


 06/09/17 14:00 Gram Stain - Final





 Lung Bilateral - Bronchial Washings 








 Laboratory Results





 06/12/17 04:40 





 06/13/17 05:15 





 











 06/12/17 06/13/17 06/14/17





 05:59 05:59 05:59


 


Intake Total 1462 400 


 


Output Total 500 1 


 


Balance 962 399 














ICD10 Worksheet


Patient Problems: 


 Problems











Problem Status Onset


 


Hypoxia Acute  


 


Pneumonia Acute  


 


Sepsis Acute

## 2017-06-27 ENCOUNTER — HOSPITAL ENCOUNTER (EMERGENCY)
Dept: HOSPITAL 80 - FED | Age: 47
Discharge: HOME | End: 2017-06-27
Payer: MEDICAID

## 2017-06-27 VITALS
DIASTOLIC BLOOD PRESSURE: 87 MMHG | OXYGEN SATURATION: 97 % | HEART RATE: 95 BPM | SYSTOLIC BLOOD PRESSURE: 123 MMHG | RESPIRATION RATE: 16 BRPM

## 2017-06-27 VITALS — TEMPERATURE: 98.1 F

## 2017-06-27 DIAGNOSIS — R07.9: Primary | ICD-10-CM

## 2017-06-27 DIAGNOSIS — F17.200: ICD-10-CM

## 2017-06-27 DIAGNOSIS — J44.9: ICD-10-CM

## 2017-06-27 LAB
% IMMATURE GRANULYOCYTES: 0.3 % (ref 0–1.1)
ABSOLUTE IMMATURE GRANULOCYTES: 0.04 10^3/UL (ref 0–0.1)
ABSOLUTE NRBC COUNT: 0 10^3/UL (ref 0–0.01)
ADD DIFF?: NO
ADD MORPH?: NO
ADD SCAN?: NO
ANION GAP SERPL CALC-SCNC: 14 MEQ/L (ref 8–16)
ATYPICAL LYMPHOCYTE FLAG: 10 (ref 0–99)
CALCIUM SERPL-MCNC: 9.5 MG/DL (ref 8.5–10.4)
CHLORIDE SERPL-SCNC: 109 MEQ/L (ref 97–110)
CO2 SERPL-SCNC: 17 MEQ/L (ref 22–31)
CREAT SERPL-MCNC: 0.7 MG/DL (ref 0.6–1)
ERYTHROCYTE [DISTWIDTH] IN BLOOD BY AUTOMATED COUNT: 15.8 % (ref 11.5–15.2)
FRAGMENT RBC FLAG: 20 (ref 0–99)
GFR SERPL CREATININE-BSD FRML MDRD: > 60 ML/MIN/{1.73_M2}
GLUCOSE SERPL-MCNC: 91 MG/DL (ref 70–100)
HCT VFR BLD CALC: 37.3 % (ref 38–47)
HGB BLD-MCNC: 12.2 G/DL (ref 12.6–16.3)
LEFT SHIFT FLG: 0 (ref 0–99)
LIPEMIA HEMOLYSIS FLAG: 80 (ref 0–99)
MCH RBC BLDCO QN: 28.6 PG (ref 27.9–34.1)
MCHC RBC AUTO-ENTMCNC: 32.7 G/DL (ref 32.4–36.7)
MCV RBC AUTO: 87.6 FL (ref 81.5–99.8)
NRBC-AUTO%: 0 % (ref 0–0.2)
PLATELET # BLD: 432 10^3/UL (ref 150–400)
PLATELET CLUMPS FLAG: 20 (ref 0–99)
PMV BLD AUTO: 10.2 FL (ref 8.7–11.7)
POTASSIUM SERPL-SCNC: 3.9 MEQ/L (ref 3.5–5.2)
RBC # BLD AUTO: 4.26 10^6/UL (ref 4.18–5.33)
SODIUM SERPL-SCNC: 140 MEQ/L (ref 134–144)
TROPONIN I SERPL-MCNC: < 0.012 NG/ML (ref 0–0.03)
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
## 2017-07-31 ENCOUNTER — HOSPITAL ENCOUNTER (EMERGENCY)
Dept: HOSPITAL 80 - FED | Age: 47
LOS: 1 days | Discharge: HOME | End: 2017-08-01
Payer: MEDICAID

## 2017-07-31 VITALS — RESPIRATION RATE: 16 BRPM | OXYGEN SATURATION: 96 %

## 2017-07-31 DIAGNOSIS — S09.90XA: Primary | ICD-10-CM
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
## 2017-07-31 DIAGNOSIS — F17.200: ICD-10-CM

## 2017-07-31 DIAGNOSIS — E86.9: ICD-10-CM
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
## 2017-07-31 DIAGNOSIS — J44.9: ICD-10-CM
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
## 2017-08-01 VITALS — HEART RATE: 67 BPM | TEMPERATURE: 97.9 F | SYSTOLIC BLOOD PRESSURE: 131 MMHG | DIASTOLIC BLOOD PRESSURE: 61 MMHG

## 2017-08-07 ENCOUNTER — HOSPITAL ENCOUNTER (EMERGENCY)
Dept: HOSPITAL 80 - FED | Age: 47
Discharge: LEFT BEFORE DISCHARGE | End: 2017-08-07
Payer: MEDICAID

## 2017-08-07 VITALS
OXYGEN SATURATION: 94 % | HEART RATE: 86 BPM | DIASTOLIC BLOOD PRESSURE: 87 MMHG | SYSTOLIC BLOOD PRESSURE: 125 MMHG | RESPIRATION RATE: 18 BRPM | TEMPERATURE: 99.1 F

## 2017-08-07 DIAGNOSIS — J44.9: ICD-10-CM

## 2017-08-07 DIAGNOSIS — F17.200: ICD-10-CM

## 2017-08-07 DIAGNOSIS — R11.2: Primary | ICD-10-CM

## 2017-08-07 LAB
% IMMATURE GRANULYOCYTES: 0.4 % (ref 0–1.1)
ABSOLUTE IMMATURE GRANULOCYTES: 0.03 10^3/UL (ref 0–0.1)
ABSOLUTE NRBC COUNT: 0 10^3/UL (ref 0–0.01)
ADD DIFF?: NO
ADD MORPH?: NO
ADD SCAN?: NO
ALBUMIN SERPL-MCNC: 4 G/DL (ref 3.5–5)
ALP SERPL-CCNC: 84 IU/L (ref 38–126)
ALT SERPL-CCNC: 33 IU/L (ref 9–52)
ANION GAP SERPL CALC-SCNC: 11 MEQ/L (ref 8–16)
AST SERPL-CCNC: 28 IU/L (ref 14–46)
ATYPICAL LYMPHOCYTE FLAG: 30 (ref 0–99)
BILIRUB SERPL-MCNC: 0.4 MG/DL (ref 0.1–1.4)
BILIRUBIN-CONJUGATED: 0.2 MG/DL (ref 0–0.5)
BILIRUBIN-UNCONJUGATED: 0.2 MG/DL (ref 0–1.1)
CALCIUM SERPL-MCNC: 10.1 MG/DL (ref 8.5–10.4)
CHLORIDE SERPL-SCNC: 107 MEQ/L (ref 97–110)
CO2 SERPL-SCNC: 24 MEQ/L (ref 22–31)
CREAT SERPL-MCNC: 0.7 MG/DL (ref 0.6–1)
ERYTHROCYTE [DISTWIDTH] IN BLOOD BY AUTOMATED COUNT: 14.2 % (ref 11.5–15.2)
FRAGMENT RBC FLAG: 0 (ref 0–99)
GFR SERPL CREATININE-BSD FRML MDRD: > 60 ML/MIN/{1.73_M2}
GLUCOSE SERPL-MCNC: 89 MG/DL (ref 70–100)
HCT VFR BLD CALC: 39.6 % (ref 38–47)
HGB BLD-MCNC: 12.7 G/DL (ref 12.6–16.3)
LEFT SHIFT FLG: 0 (ref 0–99)
LIPEMIA HEMOLYSIS FLAG: 80 (ref 0–99)
MCH RBC BLDCO QN: 27.7 PG (ref 27.9–34.1)
MCHC RBC AUTO-ENTMCNC: 32.1 G/DL (ref 32.4–36.7)
MCV RBC AUTO: 86.3 FL (ref 81.5–99.8)
NRBC-AUTO%: 0 % (ref 0–0.2)
PLATELET # BLD: 287 10^3/UL (ref 150–400)
PLATELET CLUMPS FLAG: 0 (ref 0–99)
PMV BLD AUTO: 10.6 FL (ref 8.7–11.7)
POTASSIUM SERPL-SCNC: 3.9 MEQ/L (ref 3.5–5.2)
PROT SERPL-MCNC: 7.9 G/DL (ref 6.3–8.2)
RBC # BLD AUTO: 4.59 10^6/UL (ref 4.18–5.33)
SODIUM SERPL-SCNC: 142 MEQ/L (ref 134–144)

## 2017-08-07 RX ADMIN — ONDANSETRON ONE MG: 2 SOLUTION INTRAMUSCULAR; INTRAVENOUS at 10:33

## 2017-08-07 RX ADMIN — SODIUM CHLORIDE ONE MLS: 900 INJECTION, SOLUTION INTRAVENOUS at 10:33

## 2017-08-07 RX ADMIN — PANTOPRAZOLE SODIUM ONE MLS: 40 INJECTION, POWDER, FOR SOLUTION INTRAVENOUS at 10:33
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
## 2017-11-02 ENCOUNTER — HOSPITAL ENCOUNTER (EMERGENCY)
Dept: HOSPITAL 80 - FED | Age: 47
Discharge: HOME | End: 2017-11-02
Payer: MEDICAID

## 2017-11-02 VITALS — TEMPERATURE: 98.6 F

## 2017-11-02 VITALS
RESPIRATION RATE: 16 BRPM | OXYGEN SATURATION: 94 % | SYSTOLIC BLOOD PRESSURE: 109 MMHG | HEART RATE: 84 BPM | DIASTOLIC BLOOD PRESSURE: 85 MMHG
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
## 2017-11-02 DIAGNOSIS — S01.81XA: Primary | ICD-10-CM
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
## 2017-11-02 DIAGNOSIS — F17.200: ICD-10-CM

## 2017-11-02 DIAGNOSIS — J44.9: ICD-10-CM
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
## 2017-11-02 PROCEDURE — 0HQ1XZZ REPAIR FACE SKIN, EXTERNAL APPROACH: ICD-10-PCS
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
## 2017-11-06 ENCOUNTER — HOSPITAL ENCOUNTER (EMERGENCY)
Dept: HOSPITAL 80 - FED | Age: 47
Discharge: HOME | End: 2017-11-06
Payer: MEDICAID

## 2017-11-06 VITALS — RESPIRATION RATE: 18 BRPM

## 2017-11-06 VITALS
OXYGEN SATURATION: 97 % | DIASTOLIC BLOOD PRESSURE: 85 MMHG | HEART RATE: 92 BPM | TEMPERATURE: 98.1 F | SYSTOLIC BLOOD PRESSURE: 115 MMHG

## 2017-11-06 DIAGNOSIS — F17.200: ICD-10-CM

## 2017-11-06 DIAGNOSIS — J44.9: ICD-10-CM

## 2017-11-06 DIAGNOSIS — S62.337A: Primary | ICD-10-CM
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
## 2018-02-04 ENCOUNTER — HOSPITAL ENCOUNTER (INPATIENT)
Dept: HOSPITAL 80 - FED | Age: 48
LOS: 2 days | Discharge: HOME | DRG: 190 | End: 2018-02-06
Attending: INTERNAL MEDICINE | Admitting: INTERNAL MEDICINE
Payer: MEDICAID

## 2018-02-04 ENCOUNTER — HOSPITAL ENCOUNTER (EMERGENCY)
Dept: HOSPITAL 80 - FED | Age: 48
Discharge: HOME | End: 2018-02-04
Payer: MEDICAID

## 2018-02-04 VITALS
RESPIRATION RATE: 18 BRPM | OXYGEN SATURATION: 94 % | DIASTOLIC BLOOD PRESSURE: 81 MMHG | SYSTOLIC BLOOD PRESSURE: 123 MMHG | HEART RATE: 99 BPM

## 2018-02-04 VITALS — TEMPERATURE: 98.6 F

## 2018-02-04 DIAGNOSIS — J12.89: ICD-10-CM

## 2018-02-04 DIAGNOSIS — J45.901: Primary | ICD-10-CM

## 2018-02-04 DIAGNOSIS — F41.9: ICD-10-CM

## 2018-02-04 DIAGNOSIS — F17.200: ICD-10-CM

## 2018-02-04 DIAGNOSIS — J44.1: Primary | ICD-10-CM

## 2018-02-04 DIAGNOSIS — Z59.0: ICD-10-CM

## 2018-02-04 DIAGNOSIS — R51: ICD-10-CM

## 2018-02-04 DIAGNOSIS — B97.29: ICD-10-CM

## 2018-02-04 DIAGNOSIS — F17.210: ICD-10-CM

## 2018-02-04 LAB — PLATELET # BLD: 321 10^3/UL (ref 150–400)

## 2018-02-04 SDOH — ECONOMIC STABILITY - HOUSING INSECURITY: HOMELESSNESS: Z59.0

## 2018-02-04 NOTE — CPEKG
Heart Rate: 107

RR Interval: 561

P-R Interval: 164

QRSD Interval: 104

QT Interval: 356

QTC Interval: 475

P Axis: 15

QRS Axis: 254

T Wave Axis: -11

EKG Severity - ABNORMAL ECG -

EKG Impression: SINUS TACHYCARDIA

EKG Impression: LAD, CONSIDER LEFT ANTERIOR FASCICULAR BLOCK

EKG Impression: BORDERLINE R WAVE PROGRESSION, ANTERIOR LEADS

EKG Impression: BORDERLINE T ABNORMALITIES, INFERIOR LEADS

Electronically Signed By: Elizabeth Maxwell 05-Feb-2018 15:49:12
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
## 2018-02-05 VITALS — OXYGEN SATURATION: 91 %

## 2018-02-05 LAB — PLATELET # BLD: 377 10^3/UL (ref 150–400)

## 2018-02-05 RX ADMIN — FAMOTIDINE SCH MG: 20 TABLET, FILM COATED ORAL at 20:16

## 2018-02-05 RX ADMIN — ONDANSETRON PRN MG: 4 TABLET, ORALLY DISINTEGRATING ORAL at 13:50

## 2018-02-05 RX ADMIN — ENOXAPARIN SODIUM SCH MG: 100 INJECTION SUBCUTANEOUS at 08:51

## 2018-02-05 NOTE — PDMN
Medical Necessity


Medical necessity: Change to IP, as of 2/5/18, per NP; los >2 mn for ongoing 

management of viral upper respiratory infection & COPD exacerbation; admit for 

further monitoring, supportive care & therapies; hx COPD, TBI, neuropathy & 

memory deficits; per progress note & order 2/5/18

## 2018-02-05 NOTE — ASMTCASEMG
Living Arrangements

 

What is your living           Answers:  Alone                                 

arrangement? Who do you                                                       

live with?                                                                    

Type Of Residence

 

What kind of residence do     Answers:  Homeless                              

you live in?                                                                  

Discharge Plan Comments

 

Coordination Status           

Comments                      

Notes:

Pt is a 46 y/o female admitted for shortness of breath. Pt is homeless and residing at the [facility]. Pt is originally from [place]. OT has been ordered and awaiting recommendations. Needs 

are TBD at this time. CM to follow.



Plan: TBD

 

Date Signed:  02/05/2018 11:39 AM

Electronically Signed By:OLEGARIO Gupta

## 2018-02-05 NOTE — HOSPPROG
Hospitalist Progress Note


Assessment/Plan: 








Patient is a 47-year-old female with underlying COPD.  She presented the 

emergency room with shortness of breath.  Today is my 1st encounter with the 

patient.  Chart reviewed.








* upper respiratory infection, viral


   PCR is positive for coronavirus


   Chest x-ray shows bilateral pulmonary infiltrates most likely a viral 

pneumonitis





* COPD with acute exacerbation


   Prednisone and azithromycin





* nicotine dependence


   Cessation recommended





* anxiety


   She is on clonazepam twice daily.  She may benefit from an antidepressant





*homelessness


   will need a bed reserved at the shelter, will discuss w CM








Diet - Regular


Code - Full


Ppx - LMWH


Dispo -patient feeling poorly and not well enough for dc, will require another 

midnight stay making her IP





Subjective: Pat is feeling poorly.


Objective: 


 Vital Signs











Temp Pulse Resp BP Pulse Ox


 


 36.5 C   94   20   107/70   95 


 


 02/05/18 07:56  02/05/18 07:56  02/05/18 07:56  02/05/18 07:56  02/05/18 07:56








 Laboratory Results





 02/05/18 05:15 





 02/05/18 05:15 





 











 02/04/18 02/05/18 02/06/18





 05:59 05:59 05:59


 


Intake Total  400 


 


Balance  400 














- Physical Exam


Constitutional: chronically ill appearing, uncomfortable


Eyes: PERRL


Ears, Nose, Mouth, Throat: hearing normal


Cardiovascular: regular rate and rhythym


Respiratory: no respiratory distress


Gastrointestinal: normoactive bowel sounds


Skin: warm


Musculoskeletal: full muscle strength


Neurologic: AAOx3


Psychiatric: interacting appropriately





ICD10 Worksheet


Patient Problems: 


 Problems











Problem Status Onset


 


Acute congestive heart failure Acute  


 


Bilateral pneumonia Acute  


 


COPD (chronic obstructive pulmonary disease) Acute  


 


Boxers fracture Acute  


 


Facial contusion Acute  


 


Facial laceration Acute  


 


Headache Acute  


 


Hypoxia Acute  


 


Pneumonia Acute  


 


Sepsis Acute

## 2018-02-05 NOTE — CPEKG
Heart Rate: 131

RR Interval: 458

QRSD Interval: 144

QT Interval: 372

QTC Interval: 550

QRS Axis: -54

T Wave Axis: 132

EKG Severity - ABNORMAL ECG -

EKG Impression: ATRIAL FIBRILLATION

EKG Impression: LEFT BUNDLE BRANCH BLOCK

Electronically Signed By: Anat Gaviria 06-Feb-2018 06:04:36

## 2018-02-05 NOTE — PDGENHP
History and Physical





- Chief Complaint


Shortness of breath





- History of Present Illness


48 yo F w/ COPD presents with shortness of breath. Patient reports 2 days of 

runny nose and sore throat followed by cough, shortness of breath, and 

subjective fevers. She has a history of COPD but lost her nebulizer and 

scheduled Duonebs. She has been using PRN albuterol but ran out a few days ago. 

She still smokes a few cigarettes daily. Upon arrival to the ED she was noted 

to be in mild respiratory distress with wheezing and bilateral infiltrates on 

CXR. She is being admitted for mild COPD exacerbation.





History Information





- Allergies/Home Medication List


Allergies/Adverse Reactions: 








droperidol [From Inapsine] Allergy (Severe, Verified 11/06/17 12:31)


 SEIZURES


haloperidol [From Haldol] Allergy (Severe, Verified 11/06/17 12:31)


 SEIZURES





Home Medications: 








traMADol [Ultram 50 mg (*)] 50 mg PO 08/07/17 [Last Taken Unknown]


ALBUTEROL SULFATE  11/02/17 [Last Taken Unknown]


Ranitidine HCl  11/02/17 [Last Taken Unknown]


Duoneb (*)  02/04/18 [Last Taken Unknown]


Flexeril  02/04/18 [Last Taken Unknown]


Klonopin  02/04/18 [Last Taken Unknown]





I have personally reviewed and updated: family history, medical history





- Past Medical History


Additional medical history: COPD.  GERD/hiatal hernia.  h/o closed traumatic 

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 

headaches.  anxiety disorder





- Surgical History


Reports: cholecystectomy


Additional surgical history: partial hysterectomy.  cholecystectomy.  lipoma 

resection





- Family History


Positive for: non-pertinent


Additional family history: mom had dementia.  dad had emphysema





- Social History


Smoking Status: Current every day smoker


Additional social history: Patient is originally from [place], left there in 

January and has been homeless/travelling since that time. She has remained in 

the [place] area since end of Jan/early February, now resides in [place] 

shelter. She reports having siblings and children in the [place] area.





Review of Systems


Review of Systems: 





ROS: 10pt was reviewed & negative except for what was stated in HPI & below





Physical Exam


Physical Exam: 

















Temp Pulse Resp BP Pulse Ox


 


 37.1 C   103 H  18   116/94 H  95 


 


 02/04/18 22:00  02/04/18 23:55  02/04/18 23:55  02/04/18 23:55  02/04/18 23:55











Constitutional: no apparent distress, appears nourished


Eyes: PERRL, EOMI


Ears, Nose, Mouth, Throat: moist mucous membranes, no oral mucosal ulcers


Cardiovascular: regular rate and rhythym, no murmur, rub, or gallop, JVD (JVP 

visible at clavicle at 30 degrees)


Respiratory: no respiratory distress, expiratory wheeze (Mild, diffuse), 

rhonchi (Bilateral)


Gastrointestinal: normoactive bowel sounds, soft, non-tender abdomen


Skin: warm, normal color


Musculoskeletal: full muscle strength, no muscle tenderness


Neurologic: AAOx3, CN II-XII Intact


Psychiatric: interacting appropriately, anxious





Lab Data & Imaging Review





 02/04/18 22:30





 02/04/18 22:00














WBC  14.55 10^3/uL (3.80-9.50)  H  02/04/18  22:30    


 


RBC  3.93 10^6/uL (4.18-5.33)  L  02/04/18  22:30    


 


Hgb  11.5 g/dL (12.6-16.3)  L  02/04/18  22:30    


 


Hct  34.6 % (38.0-47.0)  L  02/04/18  22:30    


 


MCV  88.0 fL (81.5-99.8)   02/04/18  22:30    


 


MCH  29.3 pg (27.9-34.1)   02/04/18  22:30    


 


MCHC  33.2 g/dL (32.4-36.7)   02/04/18  22:30    


 


RDW  15.0 % (11.5-15.2)   02/04/18  22:30    


 


Plt Count  321 10^3/uL (150-400)   02/04/18  22:30    


 


MPV  10.0 fL (8.7-11.7)   02/04/18  22:30    


 


Neut % (Auto)  84.8 % (39.3-74.2)  H  02/04/18  22:30    


 


Lymph % (Auto)  11.7 % (15.0-45.0)  L  02/04/18  22:30    


 


Mono % (Auto)  2.9 % (4.5-13.0)  L  02/04/18  22:30    


 


Eos % (Auto)  0.1 % (0.6-7.6)  L  02/04/18  22:30    


 


Baso % (Auto)  0.2 % (0.3-1.7)  L  02/04/18  22:30    


 


Nucleat RBC Rel Count  0.0 % (0.0-0.2)   02/04/18  22:30    


 


Absolute Neuts (auto)  12.35 10^3/uL (1.70-6.50)  H  02/04/18  22:30    


 


Absolute Lymphs (auto)  1.70 10^3/uL (1.00-3.00)   02/04/18  22:30    


 


Absolute Monos (auto)  0.42 10^3/uL (0.30-0.80)   02/04/18  22:30    


 


Absolute Eos (auto)  0.01 10^3/uL (0.03-0.40)  L  02/04/18  22:30    


 


Absolute Basos (auto)  0.03 10^3/uL (0.02-0.10)   02/04/18  22:30    


 


Absolute Nucleated RBC  0.00 10^3/uL (0-0.01)   02/04/18  22:30    


 


Immature Gran %  0.3 % (0.0-1.1)   02/04/18  22:30    


 


Immature Gran #  0.04 10^3/uL (0.00-0.10)   02/04/18  22:30    


 


VBG Lactic Acid  1.1 mmol/L (0.7-2.1)  D 02/05/18  00:05    


 


Sodium  142 mEq/L (135-145)   02/04/18  22:00    


 


Potassium  4.0 mEq/L (3.5-5.2)   02/04/18  22:00    


 


Chloride  103 mEq/L ()   02/04/18  22:00    


 


Carbon Dioxide  24 mEq/l (22-31)   02/04/18  22:00    


 


Anion Gap  15 mEq/L (8-16)   02/04/18  22:00    


 


BUN  12 mg/dL (7-23)   02/04/18  22:00    


 


Creatinine  0.5 mg/dL (0.6-1.0)  L  02/04/18  22:00    


 


Estimated GFR  > 60   02/04/18  22:00    


 


Glucose  112 mg/dL ()  H  02/04/18  22:00    


 


Calcium  9.1 mg/dL (8.5-10.4)   02/04/18  22:00    


 


Troponin I  0.013 ng/mL (0.000-0.034)   02/04/18  22:00    


 


NT-Pro-B Natriuret Pep  4300 pg/mL (0-125)  H  02/04/18  22:00    


 


Procalcitonin  0.06 ng/mL (0.02-0.10)   02/04/18  22:00    








Imaging Review: 





 Imaging Impressions





Chest X-Ray  02/04/18 20:59


Impression: Bilateral diffuse airspace consolidation. Differential diagnosis 

includes viral pneumonitis with interstitial edema, bronchopneumonia, and 

noninfectious pneumonitis potentially from drug reaction or cryptogenic 

organizing pneumonia.











Visualized and Interpreted EKG results: Yes


EKG Interpretation: Positive for: normal sinsus rhythm, T waves inversion (

Inferior leads)





Assessment & Plan


Assessment: 








48 yo F w/ COPD presents with mild exacerbation likely from viral etiology.





Plan: 


1. COPD with acute exacerbation - I suspect viral etiology noting hx of URI 

symptoms in combination with running out of medications and continued tobacco 

use. Overall this appears mild as she is currently saturating well on room air. 

Procalcitonin negative.


- Duonebs QID, albuterol PRN


- Prednisone 40 mg qD, azithromycin for 5 days


- Counseled tobacco cessation


- Respiratory PCR


2. Bilateral pulmonary infiltrates - Most likely viral pneumonitis. Diastolic 

CHF is a consideration noting elevated BNP and untreated respiratory disease 

but she had a similar presentation in February of 2017 and had a normal TTE at 

that time. She appears fairly euvolemic on exam.


- Will defer repeat TTE for now and treat acute exacerbation as above


- If dyspnea persists despite COPD treatment, consider TTE but likely amenable 

to outpatient work-up


3. Anxiety - This seems poorly controlled and patient takes Clonazepam BID for 

this. She would likely benefit from an SSRI.





Diet - Regular


Code - Full


Ppx - LMWH


Dispo - Admit to observation status

## 2018-02-06 VITALS
TEMPERATURE: 98.4 F | DIASTOLIC BLOOD PRESSURE: 68 MMHG | RESPIRATION RATE: 16 BRPM | HEART RATE: 64 BPM | SYSTOLIC BLOOD PRESSURE: 122 MMHG

## 2018-02-06 RX ADMIN — ENOXAPARIN SODIUM SCH MG: 100 INJECTION SUBCUTANEOUS at 09:50

## 2018-02-06 RX ADMIN — FAMOTIDINE SCH MG: 20 TABLET, FILM COATED ORAL at 09:50

## 2018-02-06 RX ADMIN — ONDANSETRON PRN MG: 4 TABLET, ORALLY DISINTEGRATING ORAL at 09:50

## 2018-02-06 NOTE — HOSPPROG
Hospitalist Progress Note


Assessment/Plan: 








Patient is a 47-year-old female with underlying COPD.  She presented the 

emergency room with shortness of breath.  Today is my 1st encounter with the 

patient.  Chart reviewed.








* upper respiratory infection, viral


   PCR is positive for coronavirus


   Chest x-ray shows bilateral pulmonary infiltrates most likely a viral 

pneumonitis





* COPD with acute exacerbation


   Prednisone and azithromycin





*headache


   usually resolves w Imitrex


   will hold tramadol this morning due to increasing seizure threshold w the 

combination of these meds





* nicotine dependence


   Cessation recommended





* anxiety


   She is on clonazepam twice daily.  She may benefit from an antidepressant





*homelessness


   will need a bed reserved at the shelter, will discuss w CM








Diet - Regular


Code - Full


Ppx - LMWH


Dispo -dc later after headache resolves


Subjective: Pat is c/o headache, she gets these at times and resolves w imitrex.


Objective: 


 Vital Signs











Temp Pulse Resp BP Pulse Ox


 


 36.9 C   64   16   122/68 H  91 L


 


 02/06/18 07:06  02/06/18 07:06  02/06/18 07:06  02/06/18 07:06  02/06/18 07:06








 











 02/05/18 02/06/18 02/07/18





 05:59 05:59 05:59


 


Intake Total  980 


 


Balance  980 














- Physical Exam


Constitutional: uncomfortable, No not in pain


Eyes: PERRL


Ears, Nose, Mouth, Throat: hearing normal


Cardiovascular: regular rate and rhythym


Respiratory: no respiratory distress, reduced air movement (bases but otherwise 

clear)


Skin: warm


Musculoskeletal: full muscle strength


Neurologic: AAOx3


Psychiatric: interacting appropriately, not anxious





ICD10 Worksheet


Patient Problems: 


 Problems











Problem Status Onset


 


Acute congestive heart failure Acute  


 


Bilateral pneumonia Acute  


 


COPD (chronic obstructive pulmonary disease) Acute  


 


Boxers fracture Acute  


 


Facial contusion Acute  


 


Facial laceration Acute  


 


Headache Acute  


 


Hypoxia Acute  


 


Pneumonia Acute  


 


Sepsis Acute

## 2018-02-06 NOTE — ASDISCHSUM
----------------------------------------------

Discharge Information

----------------------------------------------

Plan Status:Homeless/Shelter                         Medically Cleared to Leave:02/05/2018

Discharge Date:02/06/2018 12:12 PM                   CM D/C Disposition:

ADT D/C Disposition:Home, Routine, Self-Care         Projected Discharge Date:02/06/2018 12:00 AM

Transportation at D/C:                               Discharge Delay Reason:

Follow-Up Date:02/06/2018 12:00 AM                   Discharge Slot:

Final Diagnosis:

----------------------------------------------

Placement Information

----------------------------------------------

----------------------------------------------

Patient Contact Information

----------------------------------------------

Contact Name:RENATEROBE                               Relationship:Friend

Address:                                             Home [phone]

                                                     Work Phone:

City:                                                [facility] Phone:

State/[place] Code:                                      Email:

----------------------------------------------

Financial Information

----------------------------------------------

Financial Class:MD

Primary Plan Desc:[employer] [place]        Primary Plan Number:[id number]

Secondary Plan Desc:                                 Secondary Plan Number:

 

 

----------------------------------------------

Assessment Information

----------------------------------------------

----------------------------------------------

[facility] Initial CM Assessment

----------------------------------------------

Living Arrangements

 

What is your living           Answers:  Alone                                 

arrangement? Who do you                                                       

live with?                                                                    

Type Of Residence

 

What kind of residence do     Answers:  Homeless                              

you live in?                                                                  

Discharge Plan Comments

 

Coordination Status           

Comments                      

Notes:

Pt is a 46 y/o female admitted for shortness of breath. Pt is homeless and residing at the [facility]. Pt is originally from [place]. OT has been ordered and awaiting recommendations. Needs 

are TBD at this time. CM to follow.



Plan: TBD

 

Date Signed:  02/05/2018 11:39 AM

Electronically Signed By:OLEGARIO Gupta

 

 

----------------------------------------------

Case Management Discharge Plan Note

----------------------------------------------

Case Management Discharge

 

Discharge Order Complete?     Answers:  Yes                                   

Patient to Obtain             Answers:  Independently                         

Medications                                                                   

Transportation Arranged       Answers:  Other                         Notes:  Bus voucher provided

[employer] Complete               Answers:  No                                    

Case Management Transport     Answers:  No                                    

Form Complete                                                                 

Faxed Final Orders            Answers:  No                                    

Agency/Facility Transfer      Answers:  No                                    

Report Printed & Faxed to                                                     

Receiving Agency                                                              

Family Notified               Answers:  No                                    

Discharge Comments            

Notes:

CM spoke w/ ZULLY Gomez and Meryl Elizabeth NP regarding d/c POC. CM met w/ pt for dispo planning. Pt 


is being discharged today. CM reserved a shelter bed. CM provided pt w/ a bus voucher. Pt does not 

have any needs at this time. CM available for changes.







Plan: Independent 

 

Date Signed:  02/06/2018 10:14 AM

Electronically Signed By:OLEGARIO Gupta

 

 

----------------------------------------------

Intervention Information

----------------------------------------------

## 2018-02-06 NOTE — GDS
[f rep st]



                                                             DISCHARGE SUMMARY





DISCHARGE DIAGNOSES:  

1.  Upper respiratory viral infection.

2.  Chronic obstructive pulmonary disease with acute exacerbation.

3.  Headache.

4.  Nicotine dependence.

5.  Anxiety.

6.  Homelessness.



HISTORY OF PRESENT ILLNESS:  Briefly, the patient is a 47-year-old female with underlying COPD.  She 
presented to the ER with shortness of breath.  She had a PCR respiratory panel performed, which showe
d coronavirus.  She is stable and will be discharged.  The  has arranged for a bed for he
r at the shelter.



HOSPITAL COURSE BY PROBLEM:  

1.  Upper respiratory infection.  This is viral.  Her chest x-ray shows bilateral pulmonary infiltrat
es, most likely viral pneumonitis.  Her PCR is positive for coronavirus at this time.  Supportive car
e.

2.  COPD with acute exacerbation.  Will continue her on a short burst of prednisone and azithromycin.


3.  Headache.  She says she takes Imitrex, which usually resolves it.

4.  Nicotine dependence.  Cessation has been highly recommended.

5.  Anxiety.  She is on clonazepam twice daily.  She may benefit from an antidepressant.

6.  Homelessness.  Bed at the shelter has been arranged for her.



DISCHARGE CONDITION:  Stable.  Blood pressure is 122/68.  Heart rate is 64.  Respiratory rate is 16, 
O2 sats on room air 91%.  Temperature is 36.9 Celsius.



MEDICATIONS AT DISCHARGE:  Please see the EMR.



DISCHARGE INSTRUCTIONS:  

1.  To continue the prednisone as instructed.

2.  To follow up with her primary care provider to see if the antidepressant may help with her anxiet
y.  



Greater than 30 minutes discharging and coordinating patient's care.





Job #:  [id number]

## 2018-03-25 ENCOUNTER — HOSPITAL ENCOUNTER (EMERGENCY)
Dept: HOSPITAL 80 - FED | Age: 48
Discharge: HOME | End: 2018-03-25
Payer: MEDICAID

## 2018-03-25 VITALS — RESPIRATION RATE: 16 BRPM

## 2018-03-25 VITALS
HEART RATE: 79 BPM | SYSTOLIC BLOOD PRESSURE: 113 MMHG | TEMPERATURE: 98.1 F | OXYGEN SATURATION: 93 % | DIASTOLIC BLOOD PRESSURE: 85 MMHG

## 2018-03-25 DIAGNOSIS — E86.9: ICD-10-CM

## 2018-03-25 DIAGNOSIS — R11.10: Primary | ICD-10-CM

## 2018-03-25 DIAGNOSIS — J44.9: ICD-10-CM

## 2018-03-25 DIAGNOSIS — R19.7: ICD-10-CM

## 2018-03-25 DIAGNOSIS — F17.200: ICD-10-CM

## 2018-03-25 LAB — PLATELET # BLD: 331 10^3/UL (ref 150–400)
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
## 2020-11-05 ENCOUNTER — HISTORICAL (OUTPATIENT)
Dept: LAB | Facility: HOSPITAL | Age: 50
End: 2020-11-05

## 2020-11-05 LAB
ABS NEUT (OLG): 1.9 X10(3)/MCL (ref 1.5–6.9)
ALBUMIN SERPL-MCNC: 3.5 GM/DL (ref 3.5–5)
ALBUMIN/GLOB SERPL: 0.8 RATIO (ref 1.1–2)
ALP SERPL-CCNC: 200 UNIT/L (ref 40–150)
ALT SERPL-CCNC: 44 UNIT/L (ref 0–55)
APPEARANCE, UA: CLEAR
AST SERPL-CCNC: 31 UNIT/L (ref 5–34)
BACTERIA SPEC CULT: NORMAL /HPF
BILIRUB SERPL-MCNC: 0.6 MG/DL
BILIRUB UR QL STRIP: NEGATIVE
BILIRUBIN DIRECT+TOT PNL SERPL-MCNC: 0.3 MG/DL (ref 0–0.5)
BILIRUBIN DIRECT+TOT PNL SERPL-MCNC: 0.3 MG/DL (ref 0–0.8)
BUN SERPL-MCNC: 18 MG/DL (ref 9.8–20.1)
CALCIUM SERPL-MCNC: 10.9 MG/DL (ref 8.4–10.2)
CHLORIDE SERPL-SCNC: 106 MMOL/L (ref 98–107)
CHOLEST SERPL-MCNC: 192 MG/DL
CHOLEST/HDLC SERPL: 4 {RATIO} (ref 0–5)
CO2 SERPL-SCNC: 25 MMOL/L (ref 22–29)
COLOR UR: YELLOW
CREAT SERPL-MCNC: 0.45 MG/DL (ref 0.55–1.02)
DEPRECATED CALCIDIOL+CALCIFEROL SERPL-MC: 38.2 NG/ML (ref 30–80)
ERYTHROCYTE [DISTWIDTH] IN BLOOD BY AUTOMATED COUNT: 13.5 % (ref 11.5–17)
GLOBULIN SER-MCNC: 4.3 GM/DL (ref 2.4–3.5)
GLUCOSE (UA): NEGATIVE MG/DL
GLUCOSE SERPL-MCNC: 106 MG/DL (ref 74–100)
HCT VFR BLD AUTO: 42.7 % (ref 36–48)
HDLC SERPL-MCNC: 47 MG/DL (ref 35–60)
HGB BLD-MCNC: 13.3 GM/DL (ref 12–16)
HGB UR QL STRIP: ABNORMAL
KETONES UR QL STRIP: NEGATIVE MG/DL
LDLC SERPL CALC-MCNC: 115 MG/DL (ref 50–140)
LEUKOCYTE ESTERASE UR QL STRIP: NEGATIVE
LIPASE SERPL-CCNC: 14 U/L
MCH RBC QN AUTO: 26 PG (ref 27–34)
MCHC RBC AUTO-ENTMCNC: 31 GM/DL (ref 31–36)
MCV RBC AUTO: 82 FL (ref 80–99)
NITRITE UR QL STRIP: NEGATIVE
PH UR STRIP: 6.5 [PH] (ref 4.6–8)
PLATELET # BLD AUTO: 238 X10(3)/MCL (ref 140–400)
PMV BLD AUTO: 10.8 FL (ref 6.8–10)
POTASSIUM SERPL-SCNC: 3.9 MMOL/L (ref 3.5–5.1)
PROT SERPL-MCNC: 7.8 GM/DL (ref 6.4–8.3)
PROT UR QL STRIP: NEGATIVE MG/DL
RBC # BLD AUTO: 5.2 X10(6)/MCL (ref 4.2–5.4)
RBC #/AREA URNS HPF: NORMAL /[HPF]
SODIUM SERPL-SCNC: 140 MMOL/L (ref 136–145)
SP GR UR STRIP: >=1.03 (ref 1–1.03)
SQUAMOUS EPITHELIAL, UA: NORMAL
TRIGL SERPL-MCNC: 151 MG/DL (ref 37–140)
TSH SERPL-ACNC: <0.0083 UIU/ML (ref 0.35–4.94)
UROBILINOGEN UR STRIP-ACNC: 0.2 EU/DL
VLDLC SERPL CALC-MCNC: 30 MG/DL
WBC # SPEC AUTO: 5.8 X10(3)/MCL (ref 4.5–11.5)
WBC #/AREA URNS HPF: NORMAL /[HPF]

## 2020-11-06 LAB
HEMOCCULT SP1 STL QL: NEGATIVE
HEMOCCULT SP2 STL QL: NEGATIVE

## 2020-11-10 ENCOUNTER — HISTORICAL (OUTPATIENT)
Dept: LAB | Facility: HOSPITAL | Age: 50
End: 2020-11-10

## 2020-11-10 LAB
CALCIUM SERPL-MCNC: 10.1 MG/DL (ref 8.4–10.2)
PTH-INTACT SERPL-MCNC: 29.2 PG/ML (ref 8.7–77.1)
T3FREE SERPL-MCNC: 18.63 PG/ML (ref 1.71–3.71)
T4 FREE SERPL-MCNC: 3.53 NG/DL (ref 0.7–1.48)
TSH SERPL-ACNC: <0.0083 UIU/ML (ref 0.35–4.94)